# Patient Record
Sex: FEMALE | Race: WHITE | NOT HISPANIC OR LATINO | Employment: FULL TIME | URBAN - METROPOLITAN AREA
[De-identification: names, ages, dates, MRNs, and addresses within clinical notes are randomized per-mention and may not be internally consistent; named-entity substitution may affect disease eponyms.]

---

## 2017-01-20 ENCOUNTER — TELEPHONE (OUTPATIENT)
Dept: INTERNAL MEDICINE | Facility: CLINIC | Age: 42
End: 2017-01-20

## 2017-01-20 RX ORDER — PROMETHAZINE HYDROCHLORIDE 12.5 MG/1
TABLET ORAL
Qty: 10 TABLET | Refills: 0 | Status: SHIPPED | OUTPATIENT
Start: 2017-01-20 | End: 2017-01-26

## 2017-01-20 NOTE — TELEPHONE ENCOUNTER
----- Message from PIERRE Booker sent at 2017 10:39 AM EST -----  Regarding: RE: request for medication FOR VOMITING  Phenergan 12.5mg  Si po q8hrs prn N/V  Disp #10  ----- Message -----     From: Talia Alatorre MA     Sent: 2017  10:16 AM       To: PIERRE Booker  Subject: FW: request for medication FOR VOMITING              ----- Message -----     From: Landy Tripp     Sent: 2017   9:51 AM       To: Tono Brar2 [facility]  Subject: request for medication FOR VOMITING              MARILIN    STOMACH VIRUS SINCE Wednesday AFTERNOON  Still vomiting since Wednesday. Diarrhea also.  NOTHING LEFT BUT BILE AND LIQUID    Is it possible to have something phoned in to get the vomiting to stop.    Rea ([facility])  [phone]    Patient was told Dr. Kuo was not here today but would send message back if another provider would be willing or okay with sending in something.    Sent to pharmacy   Pt aware

## 2017-01-26 ENCOUNTER — OFFICE VISIT (OUTPATIENT)
Dept: INTERNAL MEDICINE | Facility: CLINIC | Age: 42
End: 2017-01-26

## 2017-01-26 VITALS
SYSTOLIC BLOOD PRESSURE: 110 MMHG | BODY MASS INDEX: 32.25 KG/M2 | HEIGHT: 63 IN | HEART RATE: 83 BPM | OXYGEN SATURATION: 98 % | WEIGHT: 182 LBS | DIASTOLIC BLOOD PRESSURE: 80 MMHG

## 2017-01-26 DIAGNOSIS — F17.200 CURRENT SMOKER: ICD-10-CM

## 2017-01-26 DIAGNOSIS — F41.8 DEPRESSION WITH ANXIETY: Primary | ICD-10-CM

## 2017-01-26 DIAGNOSIS — F41.9 ANXIETY: ICD-10-CM

## 2017-01-26 DIAGNOSIS — J45.20 MILD INTERMITTENT ASTHMA WITHOUT COMPLICATION: ICD-10-CM

## 2017-01-26 PROCEDURE — 99214 OFFICE O/P EST MOD 30 MIN: CPT | Performed by: FAMILY MEDICINE

## 2017-01-26 RX ORDER — PROMETHAZINE HYDROCHLORIDE 25 MG/1
TABLET ORAL
Qty: 20 TABLET | Refills: 0 | Status: SHIPPED | OUTPATIENT
Start: 2017-01-26 | End: 2017-05-02

## 2017-01-26 RX ORDER — CLONAZEPAM 1 MG/1
1 TABLET ORAL 2 TIMES DAILY PRN
Qty: 30 TABLET | Refills: 5 | Status: SHIPPED | OUTPATIENT
Start: 2017-01-26 | End: 2017-07-27 | Stop reason: SDUPTHER

## 2017-01-26 NOTE — MR AVS SNAPSHOT
Kadi Gonzalez   1/26/2017 1:15 PM   Office Visit    Dept Phone:  [phone]   Encounter #:  [id number]    Provider:  Bebe Kuo MD   Department:  [facility] INTERNAL MED AND PEDS                Your Full Care Plan              Today's Medication Changes          These changes are accurate as of: 1/26/17  2:02 PM.  If you have any questions, ask your nurse or doctor.               Medication(s)that have changed:     clonazePAM 1 MG tablet   Commonly known as:  KlonoPIN   Take 1 tablet by mouth 2 (Two) Times a Day As Needed for anxiety.   What changed:  reasons to take this   Changed by:  Bebe Kuo MD       promethazine 25 MG tablet   Commonly known as:  PHENERGAN   Take one po every 8 hours prn   What changed:  medication strength   Changed by:  Bebe Kuo MD         Stop taking medication(s)listed here:     benzonatate 200 MG capsule   Commonly known as:  TESSALON   Stopped by:  Bebe Kuo MD           HYDROcod Polst-CPM Polst ER 10-8 MG/5ML ER suspension   Commonly known as:  TUSSIONEX PENNKINETIC ER   Stopped by:  Bebe Kuo MD           MethylPREDNISolone 4 MG tablet   Commonly known as:  MEDROL (LOLI)   Stopped by:  Bebe Kuo MD                Where to Get Your Medications      These medications were sent to [facility] [address] [place] [address] AT [facility] - [phone] [facility] [phone] [place] [id number]     Phone:  [phone]     promethazine 25 MG tablet         You can get these medications from any pharmacy     Bring a paper prescription for each of these medications     clonazePAM 1 MG tablet                  Your Updated Medication List          This list is accurate as of: 1/26/17  2:02 PM.  Always use your most recent med list.                * albuterol 108 (90 BASE) MCG/ACT inhaler   Commonly known as:  PROVENTIL  HFA;VENTOLIN HFA   Inhale 2 puffs every 6 (six) hours as needed for wheezing. Albuterol 90 MCG/ACT AERS; Patient Sig: Albuterol 90 MCG/ACT AERS       * albuterol 1.25 MG/3ML nebulizer solution   Commonly known as:  ACCUNEB   Use 4 times daily.       clonazePAM 1 MG tablet   Commonly known as:  KlonoPIN   Take 1 tablet by mouth 2 (Two) Times a Day As Needed for anxiety.       promethazine 25 MG tablet   Commonly known as:  PHENERGAN   Take one po every 8 hours prn       * Notice:  This list has 2 medication(s) that are the same as other medications prescribed for you. Read the directions carefully, and ask your doctor or other care provider to review them with you.            You Were Diagnosed With        Codes Comments    Depression with anxiety    -  Primary ICD-10-CM: F41.8  ICD-9-CM: 300.4     Mild intermittent asthma without complication     ICD-10-CM: J45.20  ICD-9-CM: 493.90     Current smoker     ICD-10-CM: F17.200  ICD-9-CM: 305.1     Anxiety     ICD-10-CM: F41.9  ICD-9-CM: 300.00       Instructions     None    Patient Instructions History      Upcoming Appointments     Visit Type Date Time Department    FOLLOW UP 2017  1:15 PM [facility]      [facility] Signup     [employer] allows you to send messages to your doctor, view your test results, renew your prescriptions, schedule appointments, and more. To sign up, go to [employer] and click on the Sign Up Now link in the New User? box. Enter your [facility] Activation Code exactly as it appears below along with the last four digits of your Social Security Number and your Date of Birth () to complete the sign-up process. If you do not sign up before the expiration date, you must request a new code.    [facility] Activation Code: [email or web address]  Expires: 2017  2:02 PM    If you have questions, you can email [email or web address] or call [phone] to talk to our [facility] staff. Remember, [facility] is NOT to be  "used for urgent needs. For medical emergencies, dial 911.               Other Info from Your Visit           Allergies     Penicillins        Reason for Visit     Anxiety     Asthma     Follow-up           Vital Signs     Blood Pressure Pulse Height Weight Oxygen Saturation Body Mass Index    110/80 83 63\" (160 cm) 182 lb (82.6 kg) 98% 32.24 kg/m2    Smoking Status                   Current Every Day Smoker           Problems and Diagnoses Noted     Asthma    Smoker    Depression with anxiety    Anxiety problem            "

## 2017-01-26 NOTE — PROGRESS NOTES
Subjective     Kadi Gonzalez is a 41 y.o. female, who presents with a chief complaint of   Chief Complaint   Patient presents with   • Anxiety   • Asthma   • Follow-up       HPI     1. Anxiety.  She states she is doing well.  She is using clonazepam approximately 2-3 times weekly.    2. Asthma.  She states she is using albuterol nebs from the urgent care approximately every other day.  No symptoms overnight.    3. Smoking.  1 PPD.  Didn't tolerate Chantix.  Wants to quit.    The following portions of the patient's history were reviewed and updated as appropriate: allergies, current medications, past family history, past medical history, past social history, past surgical history and problem list.    Allergies: Penicillins    Review of Systems   Constitutional: Negative.    HENT: Negative.    Eyes: Negative.    Respiratory: Negative.    Cardiovascular: Negative.    Gastrointestinal: Negative.    Endocrine: Negative.    Genitourinary: Negative.    Musculoskeletal: Negative.    Skin: Negative.    Allergic/Immunologic: Negative.    Neurological: Negative.    Hematological: Negative.    Psychiatric/Behavioral: The patient is nervous/anxious.        Objective     Wt Readings from Last 3 Encounters:   01/26/17 182 lb (82.6 kg)   11/07/16 187 lb (84.8 kg)   10/22/16 180 lb (81.6 kg)     Temp Readings from Last 3 Encounters:   11/05/16 97.7 °F (36.5 °C)   10/22/16 98.4 °F (36.9 °C)   04/07/16 97.7 °F (36.5 °C)     BP Readings from Last 3 Encounters:   01/26/17 110/80   11/07/16 127/89   11/05/16 123/85     Pulse Readings from Last 3 Encounters:   01/26/17 83   11/07/16 73   11/05/16 94     Body mass index is 32.24 kg/(m^2).  SpO2 Readings from Last 3 Encounters:   01/26/17 98%   11/05/16 99%   10/22/16 96%       Physical Exam   Constitutional: She is oriented to person, place, and time. She appears well-developed and well-nourished.   HENT:   Head: Normocephalic.   Mouth/Throat: Oropharynx is clear and moist.   Eyes:  Conjunctivae and EOM are normal. Pupils are equal, round, and reactive to light.   Neck: Normal range of motion. Neck supple. No thyromegaly present.   Cardiovascular: Normal rate, regular rhythm and normal heart sounds.    Pulmonary/Chest: Effort normal and breath sounds normal.   Abdominal: Soft. Bowel sounds are normal. There is no hepatosplenomegaly.   Musculoskeletal: Normal range of motion. She exhibits no edema.   Lymphadenopathy:     She has no cervical adenopathy.   Neurological: She is alert and oriented to person, place, and time.   Skin: Skin is warm and dry. No rash noted.   Psychiatric: She has a normal mood and affect. Her behavior is normal.   Vitals reviewed.        Assessment/Plan   Kadi was seen today for anxiety, asthma and follow-up.    Diagnoses and all orders for this visit:    Depression with anxiety    Mild intermittent asthma without complication    Current smoker    Anxiety  -     clonazePAM (KlonoPIN) 1 MG tablet; Take 1 tablet by mouth 2 (Two) Times a Day As Needed for anxiety.    Other orders  -     promethazine (PHENERGAN) 25 MG tablet; Take one po every 8 hours prn    1. Anxiety.  Controlled with prn clonazepam (15-20 per month).  Med management agreement and alex UTD.    2. Asthma.  No flare.  Continue prn albuterol.    3. Smoking.  Pt counseled.      Outpatient Medications Prior to Visit   Medication Sig Dispense Refill   • albuterol (ACCUNEB) 1.25 MG/3ML nebulizer solution Use 4 times daily. 25 vial 2   • albuterol (PROVENTIL HFA;VENTOLIN HFA) 108 (90 BASE) MCG/ACT inhaler Inhale 2 puffs every 6 (six) hours as needed for wheezing. Albuterol 90 MCG/ACT AERS; Patient Sig: Albuterol 90 MCG/ACT AERS 1 inhaler 5   • clonazePAM (KlonoPIN) 1 MG tablet Take 1 tablet by mouth 2 (two) times a day as needed for seizures. 30 tablet 5   • promethazine (PHENERGAN) 12.5 MG tablet Take one po every 8 hours prn 10 tablet 0   • benzonatate (TESSALON) 200 MG capsule Take 1 capsule by mouth 3  (Three) Times a Day As Needed for cough. 30 capsule 0   • HYDROcod Polst-CPM Polst ER (TUSSIONEX PENNKINETIC ER) 10-8 MG/5ML ER suspension May take 1 teaspoon twice daily as needed for cough. 115 mL 0   • MethylPREDNISolone (MEDROL, LOLI,) 4 MG tablet Take as directed on package instructions. 21 tablet 0     No facility-administered medications prior to visit.      New Medications Ordered This Visit   Medications   • clonazePAM (KlonoPIN) 1 MG tablet     Sig: Take 1 tablet by mouth 2 (Two) Times a Day As Needed for anxiety.     Dispense:  30 tablet     Refill:  5   • promethazine (PHENERGAN) 25 MG tablet     Sig: Take one po every 8 hours prn     Dispense:  20 tablet     Refill:  0     [unfilled]  Medications Discontinued During This Encounter   Medication Reason   • benzonatate (TESSALON) 200 MG capsule Therapy completed   • HYDROcod Polst-CPM Polst ER (TUSSIONEX PENNKINETIC ER) 10-8 MG/5ML ER suspension Therapy completed   • MethylPREDNISolone (MEDROL, LOLI,) 4 MG tablet Therapy completed   • promethazine (PHENERGAN) 12.5 MG tablet Therapy completed   • clonazePAM (KlonoPIN) 1 MG tablet Reorder         Return in about 6 months (around 7/26/2017).

## 2017-05-02 ENCOUNTER — TELEPHONE (OUTPATIENT)
Dept: INTERNAL MEDICINE | Facility: CLINIC | Age: 42
End: 2017-05-02

## 2017-05-02 ENCOUNTER — HOSPITAL ENCOUNTER (OUTPATIENT)
Dept: GENERAL RADIOLOGY | Facility: HOSPITAL | Age: 42
Discharge: HOME | End: 2017-05-02
Admitting: INTERNAL MEDICINE

## 2017-05-02 ENCOUNTER — OFFICE VISIT (OUTPATIENT)
Dept: INTERNAL MEDICINE | Facility: CLINIC | Age: 42
End: 2017-05-02

## 2017-05-02 VITALS
HEIGHT: 63 IN | DIASTOLIC BLOOD PRESSURE: 78 MMHG | RESPIRATION RATE: 18 BRPM | SYSTOLIC BLOOD PRESSURE: 136 MMHG | TEMPERATURE: 101.5 F | OXYGEN SATURATION: 98 % | WEIGHT: 178.8 LBS | HEART RATE: 125 BPM | BODY MASS INDEX: 31.68 KG/M2

## 2017-05-02 DIAGNOSIS — F17.200 CURRENT SMOKER: ICD-10-CM

## 2017-05-02 DIAGNOSIS — R50.9 FEVER, UNSPECIFIED FEVER CAUSE: Primary | ICD-10-CM

## 2017-05-02 DIAGNOSIS — J18.9 PNEUMONIA DUE TO INFECTIOUS ORGANISM, UNSPECIFIED LATERALITY, UNSPECIFIED PART OF LUNG: ICD-10-CM

## 2017-05-02 LAB
ALBUMIN SERPL-MCNC: 4.4 G/DL (ref 3.5–5.2)
ALBUMIN/GLOB SERPL: 1.5 G/DL
ALP SERPL-CCNC: 90 U/L (ref 40–129)
ALT SERPL-CCNC: 43 U/L (ref 5–33)
AST SERPL-CCNC: 33 U/L (ref 5–32)
BASOPHILS # BLD AUTO: 0.02 10*3/MM3 (ref 0–0.2)
BASOPHILS NFR BLD AUTO: 0.2 % (ref 0–2)
BILIRUB SERPL-MCNC: 0.6 MG/DL (ref 0.2–1.2)
BUN SERPL-MCNC: 6 MG/DL (ref 6–20)
BUN/CREAT SERPL: 8.3 (ref 7–25)
CALCIUM SERPL-MCNC: 9.1 MG/DL (ref 8.6–10.5)
CHLORIDE SERPL-SCNC: 90 MMOL/L (ref 98–107)
CO2 SERPL-SCNC: 24.8 MMOL/L (ref 22–29)
CREAT SERPL-MCNC: 0.72 MG/DL (ref 0.57–1)
EOSINOPHIL # BLD AUTO: 0 10*3/MM3 (ref 0.1–0.3)
EOSINOPHIL NFR BLD AUTO: 0 % (ref 0–4)
ERYTHROCYTE [DISTWIDTH] IN BLOOD BY AUTOMATED COUNT: 13.2 % (ref 11.5–14.5)
EXPIRATION DATE: NORMAL
FLUAV AG NPH QL: NORMAL
FLUBV AG NPH QL: NORMAL
GLOBULIN SER CALC-MCNC: 2.9 GM/DL
GLUCOSE SERPL-MCNC: 87 MG/DL (ref 65–99)
HCT VFR BLD AUTO: 41.9 % (ref 37–47)
HGB BLD-MCNC: 14.4 G/DL (ref 12–16)
IMM GRANULOCYTES # BLD: 0.03 10*3/MM3 (ref 0–0.03)
IMM GRANULOCYTES NFR BLD: 0.3 % (ref 0–0.5)
INTERNAL CONTROL: NORMAL
LYMPHOCYTES # BLD AUTO: 1 10*3/MM3 (ref 0.6–4.8)
LYMPHOCYTES NFR BLD AUTO: 10.4 % (ref 20–45)
Lab: NORMAL
MCH RBC QN AUTO: 31.6 PG (ref 27–31)
MCHC RBC AUTO-ENTMCNC: 34.4 G/DL (ref 31–37)
MCV RBC AUTO: 91.9 FL (ref 81–99)
MONOCYTES # BLD AUTO: 0.56 10*3/MM3 (ref 0–1)
MONOCYTES NFR BLD AUTO: 5.8 % (ref 3–8)
NEUTROPHILS # BLD AUTO: 8.04 10*3/MM3 (ref 1.5–8.3)
NEUTROPHILS NFR BLD AUTO: 83.3 % (ref 45–70)
NRBC BLD AUTO-RTO: 0 /100 WBC (ref 0–0)
PLATELET # BLD AUTO: 139 10*3/MM3 (ref 140–500)
POTASSIUM SERPL-SCNC: 3.2 MMOL/L (ref 3.5–5.2)
PROT SERPL-MCNC: 7.3 G/DL (ref 6–8.5)
RBC # BLD AUTO: 4.56 10*6/MM3 (ref 4.2–5.4)
SODIUM SERPL-SCNC: 135 MMOL/L (ref 136–145)
WBC # BLD AUTO: 9.65 10*3/MM3 (ref 4.8–10.8)

## 2017-05-02 PROCEDURE — 71020 HC CHEST PA AND LATERAL: CPT

## 2017-05-02 PROCEDURE — 99214 OFFICE O/P EST MOD 30 MIN: CPT | Performed by: INTERNAL MEDICINE

## 2017-05-02 PROCEDURE — 99406 BEHAV CHNG SMOKING 3-10 MIN: CPT | Performed by: INTERNAL MEDICINE

## 2017-05-02 PROCEDURE — 87804 INFLUENZA ASSAY W/OPTIC: CPT | Performed by: INTERNAL MEDICINE

## 2017-05-02 RX ORDER — METHYLPREDNISOLONE SODIUM SUCCINATE 125 MG/2ML
125 INJECTION, POWDER, LYOPHILIZED, FOR SOLUTION INTRAMUSCULAR; INTRAVENOUS EVERY 6 HOURS
Status: DISCONTINUED | OUTPATIENT
Start: 2017-05-02 | End: 2017-07-27

## 2017-05-02 RX ORDER — LEVOFLOXACIN 500 MG/1
500 TABLET, FILM COATED ORAL DAILY
Qty: 7 TABLET | Refills: 0 | Status: SHIPPED | OUTPATIENT
Start: 2017-05-02 | End: 2017-07-27

## 2017-05-02 RX ADMIN — METHYLPREDNISOLONE SODIUM SUCCINATE 125 MG: 125 INJECTION, POWDER, LYOPHILIZED, FOR SOLUTION INTRAMUSCULAR; INTRAVENOUS at 14:16

## 2017-05-03 DIAGNOSIS — J45.909 UNCOMPLICATED ASTHMA, UNSPECIFIED ASTHMA SEVERITY: ICD-10-CM

## 2017-05-04 ENCOUNTER — TELEPHONE (OUTPATIENT)
Dept: INTERNAL MEDICINE | Facility: CLINIC | Age: 42
End: 2017-05-04

## 2017-05-04 DIAGNOSIS — J18.9 PNEUMONIA DUE TO INFECTIOUS ORGANISM, UNSPECIFIED LATERALITY, UNSPECIFIED PART OF LUNG: Primary | ICD-10-CM

## 2017-05-04 RX ORDER — ALBUTEROL SULFATE 90 UG/1
AEROSOL, METERED RESPIRATORY (INHALATION)
Qty: 18 G | Refills: 0 | Status: SHIPPED | OUTPATIENT
Start: 2017-05-04 | End: 2017-07-31 | Stop reason: SDUPTHER

## 2017-05-09 ENCOUNTER — RESULTS ENCOUNTER (OUTPATIENT)
Dept: INTERNAL MEDICINE | Facility: CLINIC | Age: 42
End: 2017-05-09

## 2017-05-09 DIAGNOSIS — J18.9 PNEUMONIA DUE TO INFECTIOUS ORGANISM, UNSPECIFIED LATERALITY, UNSPECIFIED PART OF LUNG: ICD-10-CM

## 2017-05-17 ENCOUNTER — OFFICE VISIT (OUTPATIENT)
Dept: INTERNAL MEDICINE | Facility: CLINIC | Age: 42
End: 2017-05-17

## 2017-05-17 VITALS
HEART RATE: 88 BPM | HEIGHT: 63 IN | WEIGHT: 180.4 LBS | OXYGEN SATURATION: 97 % | DIASTOLIC BLOOD PRESSURE: 80 MMHG | SYSTOLIC BLOOD PRESSURE: 122 MMHG | BODY MASS INDEX: 31.96 KG/M2

## 2017-05-17 DIAGNOSIS — J45.20 MILD INTERMITTENT ASTHMA WITHOUT COMPLICATION: Primary | ICD-10-CM

## 2017-05-17 DIAGNOSIS — J18.9 PNEUMONIA DUE TO INFECTIOUS ORGANISM, UNSPECIFIED LATERALITY, UNSPECIFIED PART OF LUNG: ICD-10-CM

## 2017-05-17 DIAGNOSIS — F17.200 CURRENT SMOKER: ICD-10-CM

## 2017-05-17 LAB
ALBUMIN SERPL-MCNC: 4 G/DL (ref 3.5–5.2)
ALBUMIN/GLOB SERPL: 1.6 G/DL
ALP SERPL-CCNC: 76 U/L (ref 40–129)
ALT SERPL-CCNC: 30 U/L (ref 5–33)
AST SERPL-CCNC: 39 U/L (ref 5–32)
BASOPHILS # BLD AUTO: 0.03 10*3/MM3 (ref 0–0.2)
BASOPHILS NFR BLD AUTO: 0.5 % (ref 0–2)
BILIRUB SERPL-MCNC: 0.3 MG/DL (ref 0.2–1.2)
BUN SERPL-MCNC: 7 MG/DL (ref 6–20)
BUN/CREAT SERPL: 11.9 (ref 7–25)
CALCIUM SERPL-MCNC: 9.3 MG/DL (ref 8.6–10.5)
CHLORIDE SERPL-SCNC: 95 MMOL/L (ref 98–107)
CO2 SERPL-SCNC: 26.9 MMOL/L (ref 22–29)
CREAT SERPL-MCNC: 0.59 MG/DL (ref 0.57–1)
EOSINOPHIL # BLD AUTO: 0.07 10*3/MM3 (ref 0.1–0.3)
EOSINOPHIL NFR BLD AUTO: 1.3 % (ref 0–4)
ERYTHROCYTE [DISTWIDTH] IN BLOOD BY AUTOMATED COUNT: 14.2 % (ref 11.5–14.5)
GLOBULIN SER CALC-MCNC: 2.5 GM/DL
GLUCOSE SERPL-MCNC: 79 MG/DL (ref 65–99)
HCT VFR BLD AUTO: 42.8 % (ref 37–47)
HGB BLD-MCNC: 14.6 G/DL (ref 12–16)
IMM GRANULOCYTES # BLD: 0.01 10*3/MM3 (ref 0–0.03)
IMM GRANULOCYTES NFR BLD: 0.2 % (ref 0–0.5)
LYMPHOCYTES # BLD AUTO: 1.56 10*3/MM3 (ref 0.6–4.8)
LYMPHOCYTES NFR BLD AUTO: 27.9 % (ref 20–45)
MCH RBC QN AUTO: 31.4 PG (ref 27–31)
MCHC RBC AUTO-ENTMCNC: 34.1 G/DL (ref 31–37)
MCV RBC AUTO: 92 FL (ref 81–99)
MONOCYTES # BLD AUTO: 0.41 10*3/MM3 (ref 0–1)
MONOCYTES NFR BLD AUTO: 7.3 % (ref 3–8)
NEUTROPHILS # BLD AUTO: 3.51 10*3/MM3 (ref 1.5–8.3)
NEUTROPHILS NFR BLD AUTO: 62.8 % (ref 45–70)
NRBC BLD AUTO-RTO: 0 /100 WBC (ref 0–0)
PLATELET # BLD AUTO: 233 10*3/MM3 (ref 140–500)
POTASSIUM SERPL-SCNC: 4.2 MMOL/L (ref 3.5–5.2)
PROT SERPL-MCNC: 6.5 G/DL (ref 6–8.5)
RBC # BLD AUTO: 4.65 10*6/MM3 (ref 4.2–5.4)
SODIUM SERPL-SCNC: 137 MMOL/L (ref 136–145)
WBC # BLD AUTO: 5.59 10*3/MM3 (ref 4.8–10.8)

## 2017-05-17 PROCEDURE — 99214 OFFICE O/P EST MOD 30 MIN: CPT | Performed by: INTERNAL MEDICINE

## 2017-05-17 PROCEDURE — 99406 BEHAV CHNG SMOKING 3-10 MIN: CPT | Performed by: INTERNAL MEDICINE

## 2017-05-25 ENCOUNTER — TELEPHONE (OUTPATIENT)
Dept: INTERNAL MEDICINE | Facility: CLINIC | Age: 42
End: 2017-05-25

## 2017-07-03 ENCOUNTER — TELEPHONE (OUTPATIENT)
Dept: INTERNAL MEDICINE | Facility: CLINIC | Age: 42
End: 2017-07-03

## 2017-07-03 NOTE — TELEPHONE ENCOUNTER
Patient notified.   ----- Message from Matt Varner MD sent at 6/30/2017  5:14 PM EDT -----  Labs are great. No changes.

## 2017-07-27 ENCOUNTER — OFFICE VISIT (OUTPATIENT)
Dept: INTERNAL MEDICINE | Facility: CLINIC | Age: 42
End: 2017-07-27

## 2017-07-27 VITALS
HEART RATE: 89 BPM | HEIGHT: 63 IN | TEMPERATURE: 98.1 F | WEIGHT: 184.6 LBS | OXYGEN SATURATION: 98 % | SYSTOLIC BLOOD PRESSURE: 110 MMHG | DIASTOLIC BLOOD PRESSURE: 72 MMHG | BODY MASS INDEX: 32.71 KG/M2

## 2017-07-27 DIAGNOSIS — F17.200 CURRENT SMOKER: ICD-10-CM

## 2017-07-27 DIAGNOSIS — F41.9 ANXIETY: ICD-10-CM

## 2017-07-27 DIAGNOSIS — Z00.00 ANNUAL PHYSICAL EXAM: Primary | ICD-10-CM

## 2017-07-27 PROCEDURE — 99396 PREV VISIT EST AGE 40-64: CPT | Performed by: FAMILY MEDICINE

## 2017-07-27 RX ORDER — CLONAZEPAM 1 MG/1
1 TABLET ORAL 2 TIMES DAILY PRN
Qty: 30 TABLET | Refills: 2 | Status: SHIPPED | OUTPATIENT
Start: 2017-07-27 | End: 2018-06-11

## 2017-07-27 NOTE — PROGRESS NOTES
Patient Name: Kadi Wallis is a 41 y.o. female presenting for Annual Exam and Anxiety      Well Adult Physical   Patient here for a comprehensive physical exam.The patient reports no problems    Do you take any herbs or supplements that were not prescribed by a doctor? no   Are you taking calcium supplements? no   Are you taking aspirin daily? no     History:  Normal pap smear and high risk HPV last year.    Kadi Gonzalez 41 y.o. female who presents for an Annual Wellness Visit.  she has a history of   Patient Active Problem List   Diagnosis   • Atopic rhinitis   • Anxiety   • Asthma   • Impaired glucose tolerance   • Visual disturbance   • Health care maintenance   • Abnormal Pap smear of cervix   • Current smoker   • Pneumonia due to infectious organism   .  she has been doing well with new interval problems.      Health Habits:  Dental Exam. up to date  Eye Exam. up to date  Exercise: 0 times/week.  Current exercise activities include: none      The following portions of the patient's history were reviewed and updated as appropriate: allergies, current medications, past family history, past medical history, past social history, past surgical history and problem list.    Review of Systems   Constitutional: Negative.    HENT: Negative.    Eyes: Negative.    Respiratory: Negative.    Cardiovascular: Negative.    Gastrointestinal: Negative.    Endocrine: Negative.    Genitourinary: Negative.    Musculoskeletal: Negative.    Skin: Negative.    Allergic/Immunologic: Negative.    Neurological: Negative.    Hematological: Negative.    Psychiatric/Behavioral: The patient is nervous/anxious.        Penicillins      Current Outpatient Prescriptions:   •  albuterol (ACCUNEB) 1.25 MG/3ML nebulizer solution, Use 4 times daily., Disp: 25 vial, Rfl: 2  •  clonazePAM (KlonoPIN) 1 MG tablet, Take 1 tablet by mouth 2 (Two) Times a Day As Needed for Anxiety., Disp: 30 tablet, Rfl: 2  •  Fluticasone  "Furoate-Vilanterol (BREO ELLIPTA) 100-25 MCG/INH aerosol powder , Inhale 1 puff Daily., Disp: 1 each, Rfl: 0  •  VENTOLIN  (90 BASE) MCG/ACT inhaler, INHALE 2 PUFFS EVERY 6 HOURS AS NEEDED FOR WHEEZING, Disp: 18 g, Rfl: 0  No current facility-administered medications for this visit.     OBJECTIVE    /72  Pulse 89  Temp 98.1 °F (36.7 °C)  Ht 63\" (160 cm)  Wt 184 lb 9.6 oz (83.7 kg)  SpO2 98%  BMI 32.7 kg/m2    Physical Exam   Constitutional: She is oriented to person, place, and time. She appears well-developed and well-nourished.   HENT:   Head: Normocephalic and atraumatic.   Right Ear: External ear normal.   Left Ear: External ear normal.   Nose: Nose normal.   Mouth/Throat: Oropharynx is clear and moist.   Eyes: Conjunctivae and EOM are normal. Pupils are equal, round, and reactive to light. No scleral icterus.   Neck: Normal range of motion. Neck supple. No thyromegaly present.   Cardiovascular: Normal rate, regular rhythm, normal heart sounds and intact distal pulses.  Exam reveals no gallop and no friction rub.    No murmur heard.  Pulmonary/Chest: Effort normal and breath sounds normal. No respiratory distress. She has no wheezes. She has no rales.   Abdominal: Soft. Bowel sounds are normal. There is no hepatosplenomegaly.   Musculoskeletal: She exhibits no edema or deformity.   Lymphadenopathy:     She has no cervical adenopathy.   Neurological: She is alert and oriented to person, place, and time. She has normal reflexes. She displays normal reflexes. No cranial nerve deficit. She exhibits normal muscle tone. Coordination normal.   Skin: Skin is warm and dry. No rash noted.   Psychiatric: She has a normal mood and affect. Her behavior is normal. Judgment and thought content normal.         [unfilled]    ASSESSMENT AND PLAN  Update vaccines if indicated.    quit smoking and begin progressive daily aerobic exercise program    Kadi was seen today for annual exam and " anxiety.    Diagnoses and all orders for this visit:    Annual physical exam  -     Mammo Screening Bilateral With CAD; Future    Anxiety  -     clonazePAM (KlonoPIN) 1 MG tablet; Take 1 tablet by mouth 2 (Two) Times a Day As Needed for Anxiety.    Current smoker      1. Annual physical exam.  Labs reviewed.  Pap smear UTD.  Mammogram ordered.  She will obtain Tdap at health department.    2. Anxiety.  Situational.  Controlled with occasional clonazepam.  Med management agreement and Fabio UTD.    3. Smoking.  Patient counseled.      [unfilled]    Return in about 3 months (around 10/27/2017), or if symptoms worsen or fail to improve.

## 2017-07-31 DIAGNOSIS — J45.909 UNCOMPLICATED ASTHMA, UNSPECIFIED ASTHMA SEVERITY: ICD-10-CM

## 2017-07-31 RX ORDER — ALBUTEROL SULFATE 90 UG/1
AEROSOL, METERED RESPIRATORY (INHALATION)
Qty: 18 G | Refills: 5 | Status: SHIPPED | OUTPATIENT
Start: 2017-07-31 | End: 2018-12-24 | Stop reason: SDUPTHER

## 2017-08-01 ENCOUNTER — HOSPITAL ENCOUNTER (OUTPATIENT)
Dept: MAMMOGRAPHY | Facility: HOSPITAL | Age: 42
Discharge: HOME | End: 2017-08-01
Attending: FAMILY MEDICINE | Admitting: FAMILY MEDICINE

## 2017-08-01 DIAGNOSIS — Z00.00 ANNUAL PHYSICAL EXAM: ICD-10-CM

## 2017-08-01 PROCEDURE — G0202 SCR MAMMO BI INCL CAD: HCPCS

## 2017-08-01 PROCEDURE — 77063 BREAST TOMOSYNTHESIS BI: CPT

## 2017-10-04 ENCOUNTER — OFFICE VISIT (OUTPATIENT)
Dept: INTERNAL MEDICINE | Facility: CLINIC | Age: 42
End: 2017-10-04

## 2017-10-04 VITALS
SYSTOLIC BLOOD PRESSURE: 104 MMHG | HEIGHT: 63 IN | BODY MASS INDEX: 33.38 KG/M2 | DIASTOLIC BLOOD PRESSURE: 64 MMHG | TEMPERATURE: 98.3 F | WEIGHT: 188.4 LBS

## 2017-10-04 DIAGNOSIS — F10.21 HISTORY OF ALCOHOLISM (HCC): ICD-10-CM

## 2017-10-04 DIAGNOSIS — F41.9 ANXIETY: Primary | ICD-10-CM

## 2017-10-04 DIAGNOSIS — G47.00 INSOMNIA, UNSPECIFIED TYPE: ICD-10-CM

## 2017-10-04 PROCEDURE — 99214 OFFICE O/P EST MOD 30 MIN: CPT | Performed by: FAMILY MEDICINE

## 2017-10-04 RX ORDER — CITALOPRAM 20 MG/1
20 TABLET ORAL DAILY
COMMUNITY
End: 2017-10-04 | Stop reason: SDUPTHER

## 2017-10-04 RX ORDER — CITALOPRAM 20 MG/1
20 TABLET ORAL DAILY
Qty: 30 TABLET | Refills: 5 | Status: SHIPPED | OUTPATIENT
Start: 2017-10-04 | End: 2018-01-08 | Stop reason: SDUPTHER

## 2017-10-04 RX ORDER — TRAZODONE HYDROCHLORIDE 100 MG/1
100 TABLET ORAL NIGHTLY
COMMUNITY
End: 2017-10-04 | Stop reason: SDUPTHER

## 2017-10-04 RX ORDER — TRAZODONE HYDROCHLORIDE 100 MG/1
100 TABLET ORAL NIGHTLY
Qty: 30 TABLET | Refills: 5 | Status: SHIPPED | OUTPATIENT
Start: 2017-10-04 | End: 2018-01-08

## 2017-10-04 RX ORDER — HYDROXYZINE PAMOATE 25 MG/1
25 CAPSULE ORAL 3 TIMES DAILY PRN
COMMUNITY
End: 2018-01-08 | Stop reason: SDUPTHER

## 2017-10-04 NOTE — PROGRESS NOTES
Subjective     Kadi Gonzalez is a 42 y.o. female, who presents with a chief complaint of   Chief Complaint   Patient presents with   • Depression       HPI     Pt here to f/u on recent medication changes.      1. Alcoholism.  She was recently treated at [facility] with intensive outpatient therapy X 21 days for alcohol abuse and is doing well.  She finished this one week ago.  She is doing well and is continuing once weekly outpatient treatment and goes to AA meetings.      2. Anxiety.  Dr. Plascencia at [facility] started her on citalopram 20 mg daily and she is doing well with this.  She was also started on hydroxyzine for anxiety but hasn't been taking this much.  She hasn't been taking clonazepam.    3. Insomnia.  Dr. Plascencia started her on trazodone and it is helping.    The following portions of the patient's history were reviewed and updated as appropriate: allergies, current medications, past family history, past medical history, past social history, past surgical history and problem list.    Allergies: Penicillins    Review of Systems   Constitutional: Negative.    HENT: Negative.    Eyes: Negative.    Respiratory: Negative.    Cardiovascular: Negative.    Gastrointestinal: Negative.    Endocrine: Negative.    Genitourinary: Negative.    Musculoskeletal: Negative.    Skin: Negative.    Allergic/Immunologic: Negative.    Neurological: Negative.    Hematological: Negative.    Psychiatric/Behavioral: The patient is nervous/anxious.        Objective     Wt Readings from Last 3 Encounters:   10/04/17 188 lb 6.4 oz (85.5 kg)   07/27/17 184 lb 9.6 oz (83.7 kg)   05/17/17 180 lb 6.4 oz (81.8 kg)     Temp Readings from Last 3 Encounters:   10/04/17 98.3 °F (36.8 °C)   07/27/17 98.1 °F (36.7 °C)   05/02/17 (!) 101.5 °F (38.6 °C)     BP Readings from Last 3 Encounters:   10/04/17 104/64   07/27/17 110/72   05/17/17 122/80     Pulse Readings from Last 3 Encounters:   07/27/17 89   05/17/17 88   05/02/17 (!) 125     Body mass  index is 33.37 kg/(m^2).  SpO2 Readings from Last 3 Encounters:   07/27/17 98%   05/17/17 97%   05/02/17 98%       Physical Exam   Constitutional: She is oriented to person, place, and time. She appears well-developed and well-nourished.   HENT:   Head: Normocephalic and atraumatic.   Eyes: Conjunctivae and EOM are normal. No scleral icterus.   Neck: Neck supple.   Cardiovascular: Normal rate, regular rhythm and normal heart sounds.    No murmur heard.  Pulmonary/Chest: Effort normal. No respiratory distress.   Abdominal: Soft. There is no hepatosplenomegaly. There is no tenderness.   Musculoskeletal: Normal range of motion. She exhibits no edema.   Lymphadenopathy:     She has no cervical adenopathy.   Neurological: She is alert and oriented to person, place, and time.   Skin: Skin is warm and dry. No rash noted.   Psychiatric: She has a normal mood and affect. Her behavior is normal.       Results for orders placed or performed in visit on 05/17/17   Comprehensive metabolic panel   Result Value Ref Range    Glucose 79 65 - 99 mg/dL    BUN 7 6 - 20 mg/dL    Creatinine 0.59 0.57 - 1.00 mg/dL    eGFR Non African Am 112 >60 mL/min/1.73    eGFR African Am 136 >60 mL/min/1.73    BUN/Creatinine Ratio 11.9 7.0 - 25.0    Sodium 137 136 - 145 mmol/L    Potassium 4.2 3.5 - 5.2 mmol/L    Chloride 95 (L) 98 - 107 mmol/L    Total CO2 26.9 22.0 - 29.0 mmol/L    Calcium 9.3 8.6 - 10.5 mg/dL    Total Protein 6.5 6.0 - 8.5 g/dL    Albumin 4.00 3.50 - 5.20 g/dL    Globulin 2.5 gm/dL    A/G Ratio 1.6 g/dL    Total Bilirubin 0.3 0.2 - 1.2 mg/dL    Alkaline Phosphatase 76 40 - 129 U/L    AST (SGOT) 39 (H) 5 - 32 U/L    ALT (SGPT) 30 5 - 33 U/L   CBC w AUTO Differential   Result Value Ref Range    WBC 5.59 4.80 - 10.80 10*3/mm3    RBC 4.65 4.20 - 5.40 10*6/mm3    Hemoglobin 14.6 12.0 - 16.0 g/dL    Hematocrit 42.8 37.0 - 47.0 %    MCV 92.0 81.0 - 99.0 fL    MCH 31.4 (H) 27.0 - 31.0 pg    MCHC 34.1 31.0 - 37.0 g/dL    RDW 14.2 11.5 -  14.5 %    Platelets 233 140 - 500 10*3/mm3    Neutrophil Rel % 62.8 45.0 - 70.0 %    Lymphocyte Rel % 27.9 20.0 - 45.0 %    Monocyte Rel % 7.3 3.0 - 8.0 %    Eosinophil Rel % 1.3 0.0 - 4.0 %    Basophil Rel % 0.5 0.0 - 2.0 %    Neutrophils Absolute 3.51 1.50 - 8.30 10*3/mm3    Lymphocytes Absolute 1.56 0.60 - 4.80 10*3/mm3    Monocytes Absolute 0.41 0.00 - 1.00 10*3/mm3    Eosinophils Absolute 0.07 (L) 0.10 - 0.30 10*3/mm3    Basophils Absolute 0.03 0.00 - 0.20 10*3/mm3    Immature Granulocyte Rel % 0.2 0.0 - 0.5 %    Immature Grans Absolute 0.01 0.00 - 0.03 10*3/mm3    nRBC 0.0 0.0 - 0.0 /100 WBC       Assessment/Plan   Kadi was seen today for depression.    Diagnoses and all orders for this visit:    Anxiety  -     citalopram (CeleXA) 20 MG tablet; Take 1 tablet by mouth Daily.    History of alcoholism    Insomnia, unspecified type  -     traZODone (DESYREL) 100 MG tablet; Take 1 tablet by mouth Every Night.      1. Anxiety. Doing well.  Continue citalopram.    2. Alcoholism.  Doing well.  Continue outpatient treatment and AA.    3. Insomnia.  Controlled with trazodone.      Outpatient Medications Prior to Visit   Medication Sig Dispense Refill   • albuterol (ACCUNEB) 1.25 MG/3ML nebulizer solution Use 4 times daily. 25 vial 2   • clonazePAM (KlonoPIN) 1 MG tablet Take 1 tablet by mouth 2 (Two) Times a Day As Needed for Anxiety. 30 tablet 2   • VENTOLIN  (90 BASE) MCG/ACT inhaler INHALE 2 PUFFS BY MOUTH EVERY 6 HOURS AS NEEDED FOR WHEEZING 18 g 5   • Fluticasone Furoate-Vilanterol (BREO ELLIPTA) 100-25 MCG/INH aerosol powder  Inhale 1 puff Daily. 1 each 0     No facility-administered medications prior to visit.      New Medications Ordered This Visit   Medications   • citalopram (CeleXA) 20 MG tablet     Sig: Take 1 tablet by mouth Daily.     Dispense:  30 tablet     Refill:  5     She would like to pick this up on 10/25/17.   • traZODone (DESYREL) 100 MG tablet     Sig: Take 1 tablet by mouth Every Night.      Dispense:  30 tablet     Refill:  5     She would like to pick this up on 10/25/17.     [unfilled]  Medications Discontinued During This Encounter   Medication Reason   • Fluticasone Furoate-Vilanterol (BREO ELLIPTA) 100-25 MCG/INH aerosol powder  Therapy completed   • citalopram (CeleXA) 20 MG tablet Reorder   • traZODone (DESYREL) 100 MG tablet Reorder         Return in about 3 months (around 1/4/2018), or if symptoms worsen or fail to improve.

## 2017-12-05 ENCOUNTER — TELEPHONE (OUTPATIENT)
Dept: INTERNAL MEDICINE | Facility: CLINIC | Age: 42
End: 2017-12-05

## 2017-12-05 NOTE — TELEPHONE ENCOUNTER
"----- Message from Bebe Gaston MD sent at 12/4/2017  6:18 PM EST -----  Regarding: RE: STOMACH ISSUES  Looks like she's been scheduled for an appointment.  ----- Message -----     From: Marie Wallace MA     Sent: 12/4/2017   4:18 PM       To: Bebe Gaston MD  Subject: FW: STOMACH ISSUES                                   ----- Message -----     From: Landy Tripp     Sent: 12/4/2017   4:04 PM       To: Marie Wallace MA  Subject: STOMACH ISSUES                                   [place]    Patient phoned today requesting appointment.  States she is having stomach pain x couple of weeks with diarrhea.  Hasn't had a solid bowel movement in weeks.    Tried altering diet to \"better\" food with no change. Ate some [employer] assuming she would pay for it but there was no difference.        This patient has an appointment for 12/11/2017  "

## 2017-12-08 ENCOUNTER — OFFICE VISIT (OUTPATIENT)
Dept: INTERNAL MEDICINE | Facility: CLINIC | Age: 42
End: 2017-12-08

## 2017-12-08 VITALS
SYSTOLIC BLOOD PRESSURE: 112 MMHG | HEIGHT: 63 IN | WEIGHT: 190 LBS | HEART RATE: 89 BPM | BODY MASS INDEX: 33.66 KG/M2 | TEMPERATURE: 97.8 F | RESPIRATION RATE: 16 BRPM | DIASTOLIC BLOOD PRESSURE: 60 MMHG | OXYGEN SATURATION: 98 %

## 2017-12-08 DIAGNOSIS — R10.9 LEFT LATERAL ABDOMINAL PAIN: ICD-10-CM

## 2017-12-08 DIAGNOSIS — K90.9 DIARRHEA DUE TO MALABSORPTION: ICD-10-CM

## 2017-12-08 DIAGNOSIS — R19.7 DIARRHEA DUE TO MALABSORPTION: ICD-10-CM

## 2017-12-08 DIAGNOSIS — R53.82 CHRONIC FATIGUE: ICD-10-CM

## 2017-12-08 DIAGNOSIS — F10.21 HISTORY OF ALCOHOLISM (HCC): Primary | ICD-10-CM

## 2017-12-08 DIAGNOSIS — R73.9 HYPERGLYCEMIA: ICD-10-CM

## 2017-12-08 LAB
ALBUMIN SERPL-MCNC: 3.9 G/DL (ref 3.5–5.2)
ALBUMIN/GLOB SERPL: 1.8 G/DL
ALP SERPL-CCNC: 68 U/L (ref 40–129)
ALT SERPL-CCNC: 40 U/L (ref 5–33)
AMYLASE SERPL-CCNC: 180 U/L (ref 28–100)
AST SERPL-CCNC: 24 U/L (ref 5–32)
BASOPHILS # BLD AUTO: 0.04 10*3/MM3 (ref 0–0.2)
BASOPHILS NFR BLD AUTO: 0.7 % (ref 0–2)
BILIRUB SERPL-MCNC: 0.3 MG/DL (ref 0.2–1.2)
BUN SERPL-MCNC: 8 MG/DL (ref 6–20)
BUN/CREAT SERPL: 13.1 (ref 7–25)
CALCIUM SERPL-MCNC: 9 MG/DL (ref 8.6–10.5)
CHLORIDE SERPL-SCNC: 102 MMOL/L (ref 98–107)
CO2 SERPL-SCNC: 27.1 MMOL/L (ref 22–29)
CREAT SERPL-MCNC: 0.61 MG/DL (ref 0.57–1)
EOSINOPHIL # BLD AUTO: 0.77 10*3/MM3 (ref 0.1–0.3)
EOSINOPHIL NFR BLD AUTO: 14.1 % (ref 0–4)
ERYTHROCYTE [DISTWIDTH] IN BLOOD BY AUTOMATED COUNT: 12.9 % (ref 11.5–14.5)
GFR SERPLBLD CREATININE-BSD FMLA CKD-EPI: 108 ML/MIN/1.73
GFR SERPLBLD CREATININE-BSD FMLA CKD-EPI: 130 ML/MIN/1.73
GLOBULIN SER CALC-MCNC: 2.2 GM/DL
GLUCOSE SERPL-MCNC: 79 MG/DL (ref 65–99)
HBA1C MFR BLD: 5.1 % (ref 4.8–5.6)
HCT VFR BLD AUTO: 40.3 % (ref 37–47)
HGB BLD-MCNC: 13.7 G/DL (ref 12–16)
IMM GRANULOCYTES # BLD: 0.01 10*3/MM3 (ref 0–0.03)
IMM GRANULOCYTES NFR BLD: 0.2 % (ref 0–0.5)
LIPASE SERPL-CCNC: 275 U/L (ref 13–60)
LYMPHOCYTES # BLD AUTO: 1.58 10*3/MM3 (ref 0.6–4.8)
LYMPHOCYTES NFR BLD AUTO: 28.9 % (ref 20–45)
MCH RBC QN AUTO: 30.9 PG (ref 27–31)
MCHC RBC AUTO-ENTMCNC: 34 G/DL (ref 31–37)
MCV RBC AUTO: 90.8 FL (ref 81–99)
MONOCYTES # BLD AUTO: 0.52 10*3/MM3 (ref 0–1)
MONOCYTES NFR BLD AUTO: 9.5 % (ref 3–8)
NEUTROPHILS # BLD AUTO: 2.55 10*3/MM3 (ref 1.5–8.3)
NEUTROPHILS NFR BLD AUTO: 46.6 % (ref 45–70)
NRBC BLD AUTO-RTO: 0 /100 WBC (ref 0–0)
PLATELET # BLD AUTO: 261 10*3/MM3 (ref 140–500)
POTASSIUM SERPL-SCNC: 4.5 MMOL/L (ref 3.5–5.2)
PROT SERPL-MCNC: 6.1 G/DL (ref 6–8.5)
RBC # BLD AUTO: 4.44 10*6/MM3 (ref 4.2–5.4)
SODIUM SERPL-SCNC: 138 MMOL/L (ref 136–145)
TSH SERPL DL<=0.005 MIU/L-ACNC: 1.76 MIU/ML (ref 0.27–4.2)
WBC # BLD AUTO: 5.47 10*3/MM3 (ref 4.8–10.8)

## 2017-12-08 PROCEDURE — 99214 OFFICE O/P EST MOD 30 MIN: CPT | Performed by: INTERNAL MEDICINE

## 2017-12-08 RX ORDER — OMEPRAZOLE 20 MG/1
20 CAPSULE, DELAYED RELEASE ORAL DAILY
Qty: 30 CAPSULE | Refills: 0 | Status: SHIPPED | OUTPATIENT
Start: 2017-12-08 | End: 2018-06-11

## 2017-12-08 NOTE — PROGRESS NOTES
Subjective     Kadi Gonzalez is a 42 y.o. female, who presents with a chief complaint of   Chief Complaint   Patient presents with   • Diarrhea       HPI   43yo female with soft stools twice daily since Thanksgiving.  Had a few solid bowel movements.  Initially with some louder Bs, which has resolved.  Drank heavily for a number of years, last time was beginning of October.  Light stools. Admits overdrinking and went to the [facility] for recent detox. No recent antibiotic at all.      No fever or chills.      She also reports back of head with some stiffness, had some pain in her upper back with cold, ran fever a few nights. Diarrhea was present before URI.  Also having some R sided pain in her RL back - thinks enlarging and would like it to check.     Still taking celexa, controlled pretty well, not currently on klonopin.    She stilll has sleep disturbance.    She does continue to smoke. Not exercising, having some more fatigued.     The following portions of the patient's history were reviewed and updated as appropriate: allergies, current medications, past family history, past medical history, past social history, past surgical history and problem list.    Allergies: Penicillins    Current Outpatient Prescriptions:   •  albuterol (ACCUNEB) 1.25 MG/3ML nebulizer solution, Use 4 times daily., Disp: 25 vial, Rfl: 2  •  citalopram (CeleXA) 20 MG tablet, Take 1 tablet by mouth Daily., Disp: 30 tablet, Rfl: 5  •  clonazePAM (KlonoPIN) 1 MG tablet, Take 1 tablet by mouth 2 (Two) Times a Day As Needed for Anxiety., Disp: 30 tablet, Rfl: 2  •  Fluticasone Furoate-Vilanterol 100-25 MCG/INH aerosol powder , Inhale., Disp: , Rfl:   •  hydrOXYzine (VISTARIL) 25 MG capsule, Take 25 mg by mouth 3 (Three) Times a Day As Needed for Itching., Disp: , Rfl:   •  omeprazole (PRILOSEC) 20 MG capsule, Take 1 capsule by mouth Daily., Disp: 30 capsule, Rfl: 0  •  traZODone (DESYREL) 100 MG tablet, Take 1 tablet by mouth Every Night., Disp:  "30 tablet, Rfl: 5  •  VENTOLIN  (90 BASE) MCG/ACT inhaler, INHALE 2 PUFFS BY MOUTH EVERY 6 HOURS AS NEEDED FOR WHEEZING, Disp: 18 g, Rfl: 5  There are no discontinued medications.    Review of Systems   Constitutional: Negative for appetite change, fatigue, fever and unexpected weight change.   HENT: Negative for sinus pressure and trouble swallowing.    Respiratory: Negative for cough and chest tightness.    Cardiovascular: Negative for chest pain, palpitations and leg swelling.   Gastrointestinal: Negative for constipation and diarrhea.   Genitourinary: Negative for dysuria, frequency, hematuria, pelvic pain and vaginal discharge.   Musculoskeletal: Negative.    Skin: Negative.    Neurological: Negative for dizziness, light-headedness and headaches.   Hematological: Negative.    Psychiatric/Behavioral: Negative.    All other systems reviewed and are negative.      Objective     /60  Pulse 89  Temp 97.8 °F (36.6 °C)  Resp 16  Ht 160 cm (63\")  Wt 86.2 kg (190 lb)  SpO2 98%  BMI 33.66 kg/m2      Physical Exam   Constitutional: She is oriented to person, place, and time. She appears well-developed and well-nourished.   HENT:   Head: Normocephalic and atraumatic.   Eyes: No scleral icterus.   Neck: No thyromegaly present.   Cardiovascular: Normal rate and regular rhythm.    No murmur heard.  Pulmonary/Chest: Effort normal and breath sounds normal. No respiratory distress.   Abdominal: Soft. She exhibits no distension and no mass. There is tenderness.   Tender over epigastrium more than L, neg rebound or guarding, organ size limited by habitus   Neurological: She is alert and oriented to person, place, and time.   Skin: Skin is warm and dry.   Psychiatric: She has a normal mood and affect. Her behavior is normal.   Nursing note and vitals reviewed.      Lab Results (most recent)     None          Results for orders placed or performed in visit on 05/17/17   Comprehensive metabolic panel   Result " Value Ref Range    Glucose 79 65 - 99 mg/dL    BUN 7 6 - 20 mg/dL    Creatinine 0.59 0.57 - 1.00 mg/dL    eGFR Non African Am 112 >60 mL/min/1.73    eGFR African Am 136 >60 mL/min/1.73    BUN/Creatinine Ratio 11.9 7.0 - 25.0    Sodium 137 136 - 145 mmol/L    Potassium 4.2 3.5 - 5.2 mmol/L    Chloride 95 (L) 98 - 107 mmol/L    Total CO2 26.9 22.0 - 29.0 mmol/L    Calcium 9.3 8.6 - 10.5 mg/dL    Total Protein 6.5 6.0 - 8.5 g/dL    Albumin 4.00 3.50 - 5.20 g/dL    Globulin 2.5 gm/dL    A/G Ratio 1.6 g/dL    Total Bilirubin 0.3 0.2 - 1.2 mg/dL    Alkaline Phosphatase 76 40 - 129 U/L    AST (SGOT) 39 (H) 5 - 32 U/L    ALT (SGPT) 30 5 - 33 U/L   CBC w AUTO Differential   Result Value Ref Range    WBC 5.59 4.80 - 10.80 10*3/mm3    RBC 4.65 4.20 - 5.40 10*6/mm3    Hemoglobin 14.6 12.0 - 16.0 g/dL    Hematocrit 42.8 37.0 - 47.0 %    MCV 92.0 81.0 - 99.0 fL    MCH 31.4 (H) 27.0 - 31.0 pg    MCHC 34.1 31.0 - 37.0 g/dL    RDW 14.2 11.5 - 14.5 %    Platelets 233 140 - 500 10*3/mm3    Neutrophil Rel % 62.8 45.0 - 70.0 %    Lymphocyte Rel % 27.9 20.0 - 45.0 %    Monocyte Rel % 7.3 3.0 - 8.0 %    Eosinophil Rel % 1.3 0.0 - 4.0 %    Basophil Rel % 0.5 0.0 - 2.0 %    Neutrophils Absolute 3.51 1.50 - 8.30 10*3/mm3    Lymphocytes Absolute 1.56 0.60 - 4.80 10*3/mm3    Monocytes Absolute 0.41 0.00 - 1.00 10*3/mm3    Eosinophils Absolute 0.07 (L) 0.10 - 0.30 10*3/mm3    Basophils Absolute 0.03 0.00 - 0.20 10*3/mm3    Immature Granulocyte Rel % 0.2 0.0 - 0.5 %    Immature Grans Absolute 0.01 0.00 - 0.03 10*3/mm3    nRBC 0.0 0.0 - 0.0 /100 WBC       Assessment/Plan   Kadi was seen today for diarrhea.    Diagnoses and all orders for this visit:    History of alcoholism    Diarrhea due to malabsorption  -     POCT occult blood x 1 stool  -     Ova & Parasite Examination - Stool, Per Rectum  -     Stool Culture - Stool, Per Rectum  -     Clostridium Difficile Toxin, PCR - Stool, Per Rectum  -     Amylase  -     Lipase    Chronic fatigue  -      CBC w AUTO Differential  -     TSH    Left lateral abdominal pain  -     Comprehensive metabolic panel  -     CBC w AUTO Differential  -     Helicobacter Pylori, IgA IgG IgM; Future  -     omeprazole (PRILOSEC) 20 MG capsule; Take 1 capsule by mouth Daily.    Hyperglycemia  -     Hemoglobin A1c    Spent 25 min in care of patient >50% discussing differential, need for possible colonsocopy if stool change beyond 6 weeks. Call sooner for more volume, bloody stools, persistent change.    Return in about 4 weeks (around 1/5/2018) for Recheck.    Matt Varner MD  12/08/2017

## 2017-12-08 NOTE — PATIENT INSTRUCTIONS
Assessment/Plan   Kadi was seen today for diarrhea.    Diagnoses and all orders for this visit:    History of alcoholism    Diarrhea due to malabsorption  -     POCT occult blood x 1 stool  -     Ova & Parasite Examination - Stool, Per Rectum  -     Stool Culture - Stool, Per Rectum  -     Clostridium Difficile Toxin, PCR - Stool, Per Rectum  -     Amylase  -     Lipase    Chronic fatigue  -     CBC w AUTO Differential  -     TSH    Left lateral abdominal pain  -     Comprehensive metabolic panel  -     CBC w AUTO Differential  -     Helicobacter Pylori, IgA IgG IgM; Future  -     omeprazole (PRILOSEC) 20 MG capsule; Take 1 capsule by mouth Daily.    Hyperglycemia  -     Hemoglobin A1c

## 2017-12-13 ENCOUNTER — RESULTS ENCOUNTER (OUTPATIENT)
Dept: INTERNAL MEDICINE | Facility: CLINIC | Age: 42
End: 2017-12-13

## 2017-12-13 DIAGNOSIS — R10.9 LEFT LATERAL ABDOMINAL PAIN: ICD-10-CM

## 2017-12-14 DIAGNOSIS — R74.8 ELEVATED AMYLASE AND LIPASE: ICD-10-CM

## 2017-12-14 DIAGNOSIS — R10.9 LEFT LATERAL ABDOMINAL PAIN: Primary | ICD-10-CM

## 2017-12-14 DIAGNOSIS — K90.9 DIARRHEA DUE TO MALABSORPTION: ICD-10-CM

## 2017-12-14 DIAGNOSIS — R19.7 DIARRHEA DUE TO MALABSORPTION: ICD-10-CM

## 2017-12-15 LAB
H PYLORI IGA SER-ACNC: <9 UNITS (ref 0–8.9)
H PYLORI IGG SER IA-ACNC: <0.9 U/ML (ref 0–0.8)
H PYLORI IGM SER-ACNC: <9 UNITS (ref 0–8.9)

## 2017-12-17 LAB
BACTERIA SPEC CULT: NORMAL
BACTERIA SPEC CULT: NORMAL
C DIFF TOX GENS STL QL NAA+PROBE: NORMAL
CAMPYLOBACTER STL CULT: NORMAL
E COLI SXT STL QL IA: NEGATIVE
O+P SPEC MICRO: NORMAL
O+P STL CONC: NORMAL
SALM + SHIG STL CULT: NORMAL
SPECIMEN STATUS: NORMAL

## 2017-12-18 ENCOUNTER — TELEPHONE (OUTPATIENT)
Dept: INTERNAL MEDICINE | Facility: CLINIC | Age: 42
End: 2017-12-18

## 2017-12-18 NOTE — TELEPHONE ENCOUNTER
-      Patient is aware---- Message from Matt Varner MD sent at 12/15/2017  5:00 PM EST -----  Bacteria testin is negative. Please see how she is doing, may need GI if persists

## 2017-12-19 ENCOUNTER — TELEPHONE (OUTPATIENT)
Dept: INTERNAL MEDICINE | Facility: CLINIC | Age: 42
End: 2017-12-19

## 2017-12-19 NOTE — TELEPHONE ENCOUNTER
Left message normal results call if questions-      ---- Message from Matt Varner MD sent at 12/19/2017  7:50 AM EST -----  Stool culture looks good.

## 2017-12-20 ENCOUNTER — HOSPITAL ENCOUNTER (OUTPATIENT)
Dept: CT IMAGING | Facility: HOSPITAL | Age: 42
Discharge: HOME | End: 2017-12-20
Attending: FAMILY MEDICINE | Admitting: FAMILY MEDICINE

## 2017-12-20 DIAGNOSIS — K90.9 DIARRHEA DUE TO MALABSORPTION: ICD-10-CM

## 2017-12-20 DIAGNOSIS — R19.7 DIARRHEA DUE TO MALABSORPTION: ICD-10-CM

## 2017-12-20 DIAGNOSIS — R74.8 ELEVATED AMYLASE AND LIPASE: ICD-10-CM

## 2017-12-20 DIAGNOSIS — R10.9 LEFT LATERAL ABDOMINAL PAIN: ICD-10-CM

## 2017-12-20 PROCEDURE — 74170 CT ABD WO CNTRST FLWD CNTRST: CPT

## 2017-12-20 PROCEDURE — 0 IOPAMIDOL PER 1 ML: Performed by: FAMILY MEDICINE

## 2017-12-20 RX ADMIN — IOPAMIDOL 100 ML: 755 INJECTION, SOLUTION INTRAVENOUS at 11:42

## 2017-12-27 ENCOUNTER — TELEPHONE (OUTPATIENT)
Dept: INTERNAL MEDICINE | Facility: CLINIC | Age: 42
End: 2017-12-27

## 2018-01-08 ENCOUNTER — OFFICE VISIT (OUTPATIENT)
Dept: INTERNAL MEDICINE | Facility: CLINIC | Age: 43
End: 2018-01-08

## 2018-01-08 VITALS
RESPIRATION RATE: 16 BRPM | DIASTOLIC BLOOD PRESSURE: 64 MMHG | SYSTOLIC BLOOD PRESSURE: 118 MMHG | BODY MASS INDEX: 35.4 KG/M2 | HEART RATE: 68 BPM | HEIGHT: 63 IN | OXYGEN SATURATION: 96 % | WEIGHT: 199.8 LBS

## 2018-01-08 DIAGNOSIS — G47.00 INSOMNIA, UNSPECIFIED TYPE: ICD-10-CM

## 2018-01-08 DIAGNOSIS — K85.20 ALCOHOL-INDUCED ACUTE PANCREATITIS, UNSPECIFIED COMPLICATION STATUS: ICD-10-CM

## 2018-01-08 DIAGNOSIS — F41.9 ANXIETY: Primary | ICD-10-CM

## 2018-01-08 DIAGNOSIS — F10.21 HISTORY OF ALCOHOLISM (HCC): ICD-10-CM

## 2018-01-08 PROCEDURE — 99214 OFFICE O/P EST MOD 30 MIN: CPT | Performed by: FAMILY MEDICINE

## 2018-01-08 RX ORDER — CITALOPRAM 40 MG/1
40 TABLET ORAL DAILY
Qty: 30 TABLET | Refills: 5 | Status: SHIPPED | OUTPATIENT
Start: 2018-01-08 | End: 2018-06-11 | Stop reason: SDUPTHER

## 2018-01-08 RX ORDER — HYDROXYZINE PAMOATE 25 MG/1
25 CAPSULE ORAL 3 TIMES DAILY PRN
Qty: 60 CAPSULE | Refills: 5 | Status: SHIPPED | OUTPATIENT
Start: 2018-01-08 | End: 2018-08-29 | Stop reason: SDUPTHER

## 2018-01-08 NOTE — PROGRESS NOTES
Subjective     Kadi Gonzalez is a 42 y.o. female, who presents with a chief complaint of   Chief Complaint   Patient presents with   • Anxiety       Anxiety   Symptoms include nervous/anxious behavior.       Pt here to f/u on recent medication changes.      1. Alcoholism.  She relapsed briefly but has been sober X 48 days.    2. Anxiety.  Tolerating citalopram.  Reports that her symptoms are controlled.  Denies SI. Taking citalopram rarely.    3. Insomnia.  Still taking trazodone and it is helping.    4. Pancreatitis. She was seen last month by Dr. Varner.  Symptoms have resolved.    The following portions of the patient's history were reviewed and updated as appropriate: allergies, current medications, past family history, past medical history, past social history, past surgical history and problem list.    Allergies: Penicillins    Review of Systems   Constitutional: Negative.    HENT: Negative.    Eyes: Negative.    Respiratory: Negative.    Cardiovascular: Negative.    Gastrointestinal: Negative.    Endocrine: Negative.    Genitourinary: Negative.    Musculoskeletal: Negative.    Skin: Negative.    Allergic/Immunologic: Negative.    Neurological: Negative.    Hematological: Negative.    Psychiatric/Behavioral: The patient is nervous/anxious.        Objective     Wt Readings from Last 3 Encounters:   01/08/18 90.6 kg (199 lb 12.8 oz)   12/08/17 86.2 kg (190 lb)   10/04/17 85.5 kg (188 lb 6.4 oz)     Temp Readings from Last 3 Encounters:   12/08/17 97.8 °F (36.6 °C)   10/04/17 98.3 °F (36.8 °C)   07/27/17 98.1 °F (36.7 °C)     BP Readings from Last 3 Encounters:   01/08/18 118/64   12/08/17 112/60   10/04/17 104/64     Pulse Readings from Last 3 Encounters:   01/08/18 68   12/08/17 89   07/27/17 89     Body mass index is 35.39 kg/(m^2).  SpO2 Readings from Last 3 Encounters:   01/08/18 96%   12/08/17 98%   07/27/17 98%       Physical Exam   Constitutional: She is oriented to person, place, and time. She appears  well-developed and well-nourished.   HENT:   Head: Normocephalic and atraumatic.   Eyes: Conjunctivae and EOM are normal. No scleral icterus.   Neck: Neck supple.   Cardiovascular: Normal rate, regular rhythm and normal heart sounds.    No murmur heard.  Pulmonary/Chest: Effort normal. No respiratory distress.   Abdominal: Soft. There is no hepatosplenomegaly. There is no tenderness.   Musculoskeletal: Normal range of motion. She exhibits no edema.   Lymphadenopathy:     She has no cervical adenopathy.   Neurological: She is alert and oriented to person, place, and time.   Skin: Skin is warm and dry. No rash noted.   Psychiatric: She has a normal mood and affect. Her behavior is normal.       Results for orders placed or performed in visit on 12/13/17   Helicobacter Pylori, IgA IgG IgM   Result Value Ref Range    H. pylori IgG <0.9 0.0 - 0.8 U/mL    H. pylori, IgA ABS <9.0 0.0 - 8.9 units    H. Pylori, IgM <9.0 0.0 - 8.9 units       Assessment/Plan   Kadi was seen today for anxiety.    Diagnoses and all orders for this visit:    Anxiety  -     citalopram (CeleXA) 40 MG tablet; Take 1 tablet by mouth Daily.  -     hydrOXYzine (VISTARIL) 25 MG capsule; Take 1 capsule by mouth 3 (Three) Times a Day As Needed for Itching.    History of alcoholism    Insomnia, unspecified type  -     hydrOXYzine (VISTARIL) 25 MG capsule; Take 1 capsule by mouth 3 (Three) Times a Day As Needed for Itching.    Alcohol-induced acute pancreatitis, unspecified complication status  -     Comprehensive Metabolic Panel  -     Amylase  -     Lipase      1. Anxiety. Doing well.  Continue citalopram.     2. Alcoholism.  Doing well.  Continue outpatient treatment and AA.    3. Insomnia.  Controlled with trazodone.    4. Pancreatitis.  Symptoms resolved.  Recheck labs today.      Outpatient Medications Prior to Visit   Medication Sig Dispense Refill   • albuterol (ACCUNEB) 1.25 MG/3ML nebulizer solution Use 4 times daily. 25 vial 2   • clonazePAM  (KlonoPIN) 1 MG tablet Take 1 tablet by mouth 2 (Two) Times a Day As Needed for Anxiety. 30 tablet 2   • Fluticasone Furoate-Vilanterol 100-25 MCG/INH aerosol powder  Inhale.     • omeprazole (PRILOSEC) 20 MG capsule Take 1 capsule by mouth Daily. 30 capsule 0   • VENTOLIN  (90 BASE) MCG/ACT inhaler INHALE 2 PUFFS BY MOUTH EVERY 6 HOURS AS NEEDED FOR WHEEZING 18 g 5   • citalopram (CeleXA) 20 MG tablet Take 1 tablet by mouth Daily. 30 tablet 5   • hydrOXYzine (VISTARIL) 25 MG capsule Take 25 mg by mouth 3 (Three) Times a Day As Needed for Itching.     • traZODone (DESYREL) 100 MG tablet Take 1 tablet by mouth Every Night. 30 tablet 5     No facility-administered medications prior to visit.      New Medications Ordered This Visit   Medications   • citalopram (CeleXA) 40 MG tablet     Sig: Take 1 tablet by mouth Daily.     Dispense:  30 tablet     Refill:  5     She would like to pick this up on 10/25/17.   • hydrOXYzine (VISTARIL) 25 MG capsule     Sig: Take 1 capsule by mouth 3 (Three) Times a Day As Needed for Itching.     Dispense:  60 capsule     Refill:  5     [unfilled]  Medications Discontinued During This Encounter   Medication Reason   • traZODone (DESYREL) 100 MG tablet    • citalopram (CeleXA) 20 MG tablet Reorder   • hydrOXYzine (VISTARIL) 25 MG capsule Reorder         Return in about 3 months (around 4/8/2018).

## 2018-06-11 ENCOUNTER — OFFICE VISIT (OUTPATIENT)
Dept: INTERNAL MEDICINE | Facility: CLINIC | Age: 43
End: 2018-06-11

## 2018-06-11 VITALS
RESPIRATION RATE: 16 BRPM | HEIGHT: 63 IN | WEIGHT: 208 LBS | OXYGEN SATURATION: 98 % | BODY MASS INDEX: 36.86 KG/M2 | HEART RATE: 80 BPM | TEMPERATURE: 98.8 F | SYSTOLIC BLOOD PRESSURE: 110 MMHG | DIASTOLIC BLOOD PRESSURE: 68 MMHG

## 2018-06-11 DIAGNOSIS — F10.21 HISTORY OF ALCOHOLISM (HCC): ICD-10-CM

## 2018-06-11 DIAGNOSIS — F41.9 ANXIETY: Primary | ICD-10-CM

## 2018-06-11 DIAGNOSIS — G47.00 INSOMNIA, UNSPECIFIED TYPE: ICD-10-CM

## 2018-06-11 DIAGNOSIS — F17.200 CURRENT SMOKER: ICD-10-CM

## 2018-06-11 DIAGNOSIS — J45.20 MILD INTERMITTENT ASTHMA WITHOUT COMPLICATION: ICD-10-CM

## 2018-06-11 PROBLEM — J18.9 PNEUMONIA DUE TO INFECTIOUS ORGANISM: Status: RESOLVED | Noted: 2017-05-02 | Resolved: 2018-06-11

## 2018-06-11 PROCEDURE — 99214 OFFICE O/P EST MOD 30 MIN: CPT | Performed by: FAMILY MEDICINE

## 2018-06-11 RX ORDER — CITALOPRAM 40 MG/1
40 TABLET ORAL DAILY
Qty: 30 TABLET | Refills: 5 | Status: SHIPPED | OUTPATIENT
Start: 2018-06-11 | End: 2018-12-24 | Stop reason: SDUPTHER

## 2018-06-11 RX ORDER — AMITRIPTYLINE HYDROCHLORIDE 25 MG/1
25 TABLET, FILM COATED ORAL NIGHTLY
Qty: 30 TABLET | Refills: 5 | Status: SHIPPED | OUTPATIENT
Start: 2018-06-11 | End: 2019-06-24 | Stop reason: SDUPTHER

## 2018-06-11 NOTE — PROGRESS NOTES
Subjective     Kadi Gonzalez is a 42 y.o. female, who presents with a chief complaint of   Chief Complaint   Patient presents with   • Anxiety       Anxiety   Symptoms include nervous/anxious behavior.       1. Asthma.  With the recent humidity, she has had mild flare; has used albuterol twice this past week, never during the middle of the night.    2. Anxiety.  Tolerating citalopram.  Reports that her symptoms are well-controlled.  Denies SI.     3. Insomnia.  She would like to restart a medication.    The following portions of the patient's history were reviewed and updated as appropriate: allergies, current medications, past family history, past medical history, past social history, past surgical history and problem list.    Allergies: Penicillins    Review of Systems   Constitutional: Negative.    HENT: Negative.    Eyes: Negative.    Respiratory: Negative.    Cardiovascular: Negative.    Gastrointestinal: Negative.    Endocrine: Negative.    Genitourinary: Negative.    Musculoskeletal: Negative.    Skin: Negative.    Allergic/Immunologic: Negative.    Neurological: Negative.    Hematological: Negative.    Psychiatric/Behavioral: The patient is nervous/anxious.        Objective     Wt Readings from Last 3 Encounters:   06/11/18 94.3 kg (208 lb)   01/08/18 90.6 kg (199 lb 12.8 oz)   12/08/17 86.2 kg (190 lb)     Temp Readings from Last 3 Encounters:   06/11/18 98.8 °F (37.1 °C)   03/01/18 98.4 °F (36.9 °C)   12/08/17 97.8 °F (36.6 °C)     BP Readings from Last 3 Encounters:   06/11/18 110/68   03/01/18 115/74   01/08/18 118/64     Pulse Readings from Last 3 Encounters:   06/11/18 80   03/01/18 71   01/08/18 68     Body mass index is 36.85 kg/m².  SpO2 Readings from Last 3 Encounters:   01/08/18 96%   12/08/17 98%   07/27/17 98%       Physical Exam   Constitutional: She is oriented to person, place, and time. She appears well-developed and well-nourished.   HENT:   Head: Normocephalic and atraumatic.   Eyes:  Conjunctivae and EOM are normal. No scleral icterus.   Neck: Neck supple.   Cardiovascular: Normal rate, regular rhythm and normal heart sounds.    No murmur heard.  Pulmonary/Chest: Effort normal. No respiratory distress.   Abdominal: Soft. There is no hepatosplenomegaly. There is no tenderness.   Musculoskeletal: Normal range of motion. She exhibits no edema.   Lymphadenopathy:     She has no cervical adenopathy.   Neurological: She is alert and oriented to person, place, and time.   Skin: Skin is warm and dry. No rash noted.   Psychiatric: She has a normal mood and affect. Her behavior is normal.   Nursing note and vitals reviewed.      Results for orders placed or performed in visit on 12/13/17   Helicobacter Pylori, IgA IgG IgM   Result Value Ref Range    H. pylori IgG <0.9 0.0 - 0.8 U/mL    H. pylori, IgA ABS <9.0 0.0 - 8.9 units    H. Pylori, IgM <9.0 0.0 - 8.9 units       Assessment/Plan   Kadi was seen today for anxiety.    Diagnoses and all orders for this visit:    Anxiety    Insomnia, unspecified type    Current smoker    Mild intermittent asthma without complication      1. Anxiety. Doing well.  Continue citalopram.     2.  Asthma.  Continue albuterol prn.  More frequent use would indicate need for daily controller.    3. Insomnia.  Start amitriptyline.    4. Smoking.  She will consider quitting after her daughter gets .      Outpatient Medications Prior to Visit   Medication Sig Dispense Refill   • citalopram (CeleXA) 40 MG tablet Take 1 tablet by mouth Daily. 30 tablet 5   • hydrOXYzine (VISTARIL) 25 MG capsule Take 1 capsule by mouth 3 (Three) Times a Day As Needed for Itching. 60 capsule 5   • VENTOLIN  (90 BASE) MCG/ACT inhaler INHALE 2 PUFFS BY MOUTH EVERY 6 HOURS AS NEEDED FOR WHEEZING 18 g 5   • albuterol (ACCUNEB) 1.25 MG/3ML nebulizer solution Use 4 times daily. 25 vial 2   • clonazePAM (KlonoPIN) 1 MG tablet Take 1 tablet by mouth 2 (Two) Times a Day As Needed for Anxiety. 30  tablet 2   • doxycycline (MONODOX) 100 MG capsule Take 1 capsule by mouth 2 (Two) Times a Day. 20 capsule 0   • Fluticasone Furoate-Vilanterol 100-25 MCG/INH aerosol powder  Inhale.     • guaifenesin-codeine (GUAIFENESIN AC) 100-10 MG/5ML liquid Take 5 mL by mouth At Night As Needed for Cough. 180 mL 0   • omeprazole (PRILOSEC) 20 MG capsule Take 1 capsule by mouth Daily. 30 capsule 0     No facility-administered medications prior to visit.      No orders of the defined types were placed in this encounter.    [unfilled]  Medications Discontinued During This Encounter   Medication Reason   • omeprazole (PRILOSEC) 20 MG capsule *Therapy completed   • albuterol (ACCUNEB) 1.25 MG/3ML nebulizer solution *Therapy completed   • Fluticasone Furoate-Vilanterol 100-25 MCG/INH aerosol powder  *Therapy completed   • doxycycline (MONODOX) 100 MG capsule *Therapy completed   • guaifenesin-codeine (GUAIFENESIN AC) 100-10 MG/5ML liquid *Therapy completed   • clonazePAM (KlonoPIN) 1 MG tablet *Therapy completed         Return in about 6 months (around 12/11/2018).

## 2018-08-29 DIAGNOSIS — G47.00 INSOMNIA, UNSPECIFIED TYPE: ICD-10-CM

## 2018-08-29 DIAGNOSIS — F41.9 ANXIETY: ICD-10-CM

## 2018-08-29 RX ORDER — HYDROXYZINE PAMOATE 25 MG/1
CAPSULE ORAL
Qty: 60 CAPSULE | Refills: 1 | Status: SHIPPED | OUTPATIENT
Start: 2018-08-29 | End: 2019-06-24 | Stop reason: SDUPTHER

## 2018-12-09 DIAGNOSIS — J45.41 ASTHMATIC BRONCHITIS, MODERATE PERSISTENT, WITH ACUTE EXACERBATION: ICD-10-CM

## 2018-12-10 RX ORDER — ALBUTEROL SULFATE 1.25 MG/3ML
SOLUTION RESPIRATORY (INHALATION)
Qty: 75 ML | Refills: 5 | OUTPATIENT
Start: 2018-12-10 | End: 2020-03-10

## 2018-12-12 ENCOUNTER — OFFICE VISIT (OUTPATIENT)
Dept: INTERNAL MEDICINE | Facility: CLINIC | Age: 43
End: 2018-12-12

## 2018-12-12 VITALS
BODY MASS INDEX: 41.04 KG/M2 | TEMPERATURE: 99 F | HEIGHT: 62 IN | DIASTOLIC BLOOD PRESSURE: 64 MMHG | OXYGEN SATURATION: 98 % | SYSTOLIC BLOOD PRESSURE: 118 MMHG | WEIGHT: 223 LBS | RESPIRATION RATE: 16 BRPM | HEART RATE: 85 BPM

## 2018-12-12 DIAGNOSIS — J40 BRONCHITIS: Primary | ICD-10-CM

## 2018-12-12 DIAGNOSIS — F17.200 CURRENT SMOKER: ICD-10-CM

## 2018-12-12 PROCEDURE — 99213 OFFICE O/P EST LOW 20 MIN: CPT | Performed by: FAMILY MEDICINE

## 2018-12-12 RX ORDER — AZITHROMYCIN 250 MG/1
TABLET, FILM COATED ORAL
Qty: 6 TABLET | Refills: 0 | Status: SHIPPED | OUTPATIENT
Start: 2018-12-12 | End: 2019-01-18

## 2018-12-12 RX ORDER — BENZONATATE 200 MG/1
200 CAPSULE ORAL 3 TIMES DAILY PRN
Qty: 30 CAPSULE | Refills: 0 | Status: SHIPPED | OUTPATIENT
Start: 2018-12-12 | End: 2019-05-15 | Stop reason: SDUPTHER

## 2018-12-12 NOTE — PROGRESS NOTES
Kadi Gonzalez is a 43 y.o. female, who presents with a chief complaint of   Chief Complaint   Patient presents with   • Cough   • URI     drainage, green phlegm, x on and off 3 months       HPI     Pt presents with the complaint of one week of coughing fits, congestion, post nasal drip, sore throat, hoarse voice, low grade fevers.  No chills, nausea, or vomiting.  The cough is interfering with work and sleep.  She has been using albuterol nebs and Dimetapp.    Smoking.  Pt has chosen a quit date as January 7th.      The following portions of the patient's history were reviewed and updated as appropriate: allergies, current medications, past family history, past medical history, past social history, past surgical history and problem list.    Allergies: Penicillins    Review of Systems   HENT: Positive for congestion, postnasal drip, rhinorrhea and sore throat.    Eyes: Negative.    Respiratory: Positive for cough. Negative for shortness of breath and wheezing.    Cardiovascular: Negative.    Gastrointestinal: Negative.    Endocrine: Negative.    Genitourinary: Negative.    Musculoskeletal: Negative.    Skin: Negative.    Allergic/Immunologic: Positive for environmental allergies.   Neurological: Negative.    Hematological: Negative.    Psychiatric/Behavioral: Negative.              Wt Readings from Last 3 Encounters:   12/12/18 101 kg (223 lb)   09/09/18 90.7 kg (200 lb)   06/11/18 94.3 kg (208 lb)     Temp Readings from Last 3 Encounters:   12/12/18 99 °F (37.2 °C)   09/09/18 99 °F (37.2 °C) (Temporal Artery )   06/11/18 98.8 °F (37.1 °C)     BP Readings from Last 3 Encounters:   12/12/18 118/64   09/09/18 121/81   06/11/18 110/68     Pulse Readings from Last 3 Encounters:   12/12/18 85   09/09/18 78   06/11/18 80     Body mass index is 40.79 kg/m².  @LASTSAO2(3)@    Physical Exam   Constitutional: She is oriented to person, place, and time. She appears well-developed and well-nourished.   HENT:   Head:  Normocephalic and atraumatic.   Right Ear: Tympanic membrane normal.   Left Ear: Tympanic membrane normal.   Eyes: Conjunctivae and EOM are normal.   Neck: Neck supple. No thyromegaly present.   Cardiovascular: Normal rate, regular rhythm and normal heart sounds.   Pulmonary/Chest: Effort normal. She has no wheezes.   Musculoskeletal: Normal range of motion. She exhibits no edema.   Neurological: She is alert and oriented to person, place, and time.   Skin: Skin is warm and dry.   Psychiatric: She has a normal mood and affect. Her behavior is normal.   Nursing note and vitals reviewed.      Results for orders placed or performed in visit on 12/13/17   Helicobacter Pylori, IgA IgG IgM   Result Value Ref Range    H. pylori IgG <0.9 0.0 - 0.8 U/mL    H. pylori, IgA ABS <9.0 0.0 - 8.9 units    H. Pylori, IgM <9.0 0.0 - 8.9 units           Kadi was seen today for cough and uri.    Diagnoses and all orders for this visit:    Bronchitis  -     azithromycin (ZITHROMAX) 250 MG tablet; Take 2 tablets the first day, then 1 tablet daily for 4 days.  -     HYDROcodone-homatropine (HYCODAN) 5-1.5 MG/5ML syrup; Take 5 mL by mouth Every 6 (Six) Hours As Needed for Cough.  -     benzonatate (TESSALON) 200 MG capsule; Take 1 capsule by mouth 3 (Three) Times a Day As Needed for Cough.    Current smoker  -     varenicline (CHANTIX STARTING MONTH PAK) 0.5 MG X 11 & 1 MG X 42 tablet; Take 0.5 mg one daily on days 1-3 and and 0.5 mg twice daily on days 4-7.Then 1 mg twice daily for a total of 12 weeks.    1. Bronchitis.  Start antibiotics if she hasn't turned a corner in 2-3 more days; (hold citalopram).  Cough suppressants and symptomatic treatment.  Warning s/sxs discussed.    2. Smoking.  Pt would like to quit.  Pt counseled.  Start Chantix.      Outpatient Medications Prior to Visit   Medication Sig Dispense Refill   • albuterol (ACCUNEB) 1.25 MG/3ML nebulizer solution USE 1 VIAL VIA NEBULIZER FOUR TIMES DAILY 75 mL 5   •  amitriptyline (ELAVIL) 25 MG tablet Take 1 tablet by mouth Every Night. 30 tablet 5   • citalopram (CeleXA) 40 MG tablet Take 1 tablet by mouth Daily. 30 tablet 5   • hydrOXYzine (VISTARIL) 25 MG capsule TAKE 1 CAPSULE BY MOUTH THREE TIMES DAILY AS NEEDED FOR ITCHING 60 capsule 1   • VENTOLIN  (90 BASE) MCG/ACT inhaler INHALE 2 PUFFS BY MOUTH EVERY 6 HOURS AS NEEDED FOR WHEEZING 18 g 5   • HYDROcod Polst-CPM Polst ER (TUSSIONEX PENNKINETIC ER) 10-8 MG/5ML ER suspension Take 5 mL by mouth Every 12 (Twelve) Hours As Needed for Cough. 115 mL 0   • cefdinir (OMNICEF) 300 MG capsule Take 1 capsule by mouth 2 (Two) Times a Day. 20 capsule 0   • MethylPREDNISolone (MEDROL, LOLI,) 4 MG tablet Take as directed on package instructions. 21 each 0     No facility-administered medications prior to visit.      New Medications Ordered This Visit   Medications   • azithromycin (ZITHROMAX) 250 MG tablet     Sig: Take 2 tablets the first day, then 1 tablet daily for 4 days.     Dispense:  6 tablet     Refill:  0   • varenicline (CHANTIX STARTING MONTH PAK) 0.5 MG X 11 & 1 MG X 42 tablet     Sig: Take 0.5 mg one daily on days 1-3 and and 0.5 mg twice daily on days 4-7.Then 1 mg twice daily for a total of 12 weeks.     Dispense:  53 tablet     Refill:  5     Refill continuing dose packs.   • HYDROcodone-homatropine (HYCODAN) 5-1.5 MG/5ML syrup     Sig: Take 5 mL by mouth Every 6 (Six) Hours As Needed for Cough.     Dispense:  120 mL     Refill:  0   • benzonatate (TESSALON) 200 MG capsule     Sig: Take 1 capsule by mouth 3 (Three) Times a Day As Needed for Cough.     Dispense:  30 capsule     Refill:  0     [unfilled]  Medications Discontinued During This Encounter   Medication Reason   • cefdinir (OMNICEF) 300 MG capsule *Therapy completed   • HYDROcod Polst-CPM Polst ER (TUSSIONEX PENNKINETIC ER) 10-8 MG/5ML ER suspension *Therapy completed   • MethylPREDNISolone (MEDROL, LOLI,) 4 MG tablet *Therapy completed         Return  if symptoms worsen or fail to improve.

## 2018-12-24 DIAGNOSIS — F41.9 ANXIETY: ICD-10-CM

## 2018-12-24 DIAGNOSIS — J45.909 UNCOMPLICATED ASTHMA: ICD-10-CM

## 2018-12-26 RX ORDER — ALBUTEROL SULFATE 90 UG/1
AEROSOL, METERED RESPIRATORY (INHALATION)
Qty: 18 G | Refills: 0 | Status: SHIPPED | OUTPATIENT
Start: 2018-12-26 | End: 2019-12-04 | Stop reason: SDUPTHER

## 2018-12-26 RX ORDER — CITALOPRAM 40 MG/1
40 TABLET ORAL DAILY
Qty: 30 TABLET | Refills: 0 | Status: SHIPPED | OUTPATIENT
Start: 2018-12-26 | End: 2019-01-18 | Stop reason: SDUPTHER

## 2019-01-18 DIAGNOSIS — F41.9 ANXIETY: ICD-10-CM

## 2019-01-18 RX ORDER — CITALOPRAM 40 MG/1
40 TABLET ORAL DAILY
Qty: 90 TABLET | Refills: 1 | Status: SHIPPED | OUTPATIENT
Start: 2019-01-18 | End: 2019-09-03 | Stop reason: SDUPTHER

## 2019-03-07 ENCOUNTER — TELEPHONE (OUTPATIENT)
Dept: OBSTETRICS AND GYNECOLOGY | Age: 44
End: 2019-03-07

## 2019-05-15 ENCOUNTER — PROCEDURE VISIT (OUTPATIENT)
Dept: OBSTETRICS AND GYNECOLOGY | Age: 44
End: 2019-05-15

## 2019-05-15 ENCOUNTER — OFFICE VISIT (OUTPATIENT)
Dept: OBSTETRICS AND GYNECOLOGY | Age: 44
End: 2019-05-15

## 2019-05-15 DIAGNOSIS — N93.9 ABNORMAL UTERINE BLEEDING (AUB): Primary | ICD-10-CM

## 2019-05-15 DIAGNOSIS — N92.6 IRREGULAR MENSTRUAL BLEEDING: Primary | ICD-10-CM

## 2019-05-15 DIAGNOSIS — Z01.812 PRE-PROCEDURE LAB EXAM: ICD-10-CM

## 2019-05-15 DIAGNOSIS — Z11.3 SCREEN FOR STD (SEXUALLY TRANSMITTED DISEASE): ICD-10-CM

## 2019-05-15 DIAGNOSIS — F17.200 CURRENT SMOKER: ICD-10-CM

## 2019-05-15 DIAGNOSIS — R73.02 IMPAIRED GLUCOSE TOLERANCE: ICD-10-CM

## 2019-05-15 LAB
B-HCG UR QL: NEGATIVE
INTERNAL NEGATIVE CONTROL: NEGATIVE
INTERNAL POSITIVE CONTROL: POSITIVE
Lab: NORMAL

## 2019-05-15 PROCEDURE — 81025 URINE PREGNANCY TEST: CPT | Performed by: OBSTETRICS & GYNECOLOGY

## 2019-05-15 PROCEDURE — 99203 OFFICE O/P NEW LOW 30 MIN: CPT | Performed by: OBSTETRICS & GYNECOLOGY

## 2019-05-15 PROCEDURE — 76830 TRANSVAGINAL US NON-OB: CPT | Performed by: OBSTETRICS & GYNECOLOGY

## 2019-05-15 PROCEDURE — 58100 BIOPSY OF UTERUS LINING: CPT | Performed by: OBSTETRICS & GYNECOLOGY

## 2019-05-15 RX ORDER — VARENICLINE TARTRATE 1 MG/1
TABLET, FILM COATED ORAL
Refills: 4 | COMMUNITY
Start: 2019-04-22 | End: 2020-03-10

## 2019-05-15 NOTE — PROGRESS NOTES
"Subjective     Chief Complaint   Patient presents with   • Gynecologic Exam     annual exam       Kadi Gonzalez is a 43 y.o.  whose LMP is Patient's last menstrual period was 2019 (exact date). presents for New Gyn. C/o AUB for 6 mo. Prior to this had normal menses. Now has bleeding    2-5 weeks w/ menorrhagia and intermenstrual bleeding/spotting. C/o 100 lb weight gain over last 10 years. Pt is  and is newly sexually active again and has had unprotected intercourse.  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 +tobacco use 1ppd (desires to quit and is on chantix)      No Additional Complaints Reported    The following portions of the patient's history were reviewed and updated as appropriate:vital signs, allergies, current medications, past family history, past medical history, past social history, past surgical history and problem list      Review of Systems   A comprehensive review of systems was negative except for: Genitourinary: positive for abnormal menstrual periods     Objective      /82   Ht 157.5 cm (62\")   Wt 99.5 kg (219 lb 6.4 oz)   LMP 2019 (Exact Date)   Breastfeeding? No   BMI 40.13 kg/m²     Physical Exam    General:   alert, appears stated age and no distress   Heart:    Lungs:    Breast:    Neck:    Abdomen:    CVA:    Pelvis: Vulva and vagina appear normal. Bimanual exam reveals normal uterus and adnexa.  External genitalia: normal general appearance  Cervix: normal appearance  Adnexa: normal bimanual exam  Uterus: normal single, nontender and anteverted   Extremities: Extremities normal, atraumatic, no cyanosis or edema   Neurologic: negative   Psychiatric: Normal affect, judgement, and mood       Lab Review   Labs: Urine pregnancy test negative    Imaging   Ultrasound - Pelvic Vaginal  Uterus 7.8 x 6 x 4 cm, EML=7.6 mm, normal ovaries with small follicles    Assessment/Plan     ASSESSMENT  1. Abnormal uterine bleeding (AUB)    2. " Screen for STD (sexually transmitted disease)    3. Pre-procedure lab exam    4. Current smoker    5. Impaired glucose tolerance        PLAN  1.   Orders Placed This Encounter   Procedures   • Chlamydia trachomatis, Neisseria gonorrhoeae, PCR - Urine, Urine, Clean Catch   • HIV-1 / O / 2 Ag / Antibody 4th Generation   • Hepatitis B Surface Antigen   • Hepatitis C Antibody   • RPR   • TSH   • Hemoglobin A1c   • POC Pregnancy, Urine   • Endometrial biopsy       2. Procedure- informed consent obtained for endometrial biopsy. Verbal consent obtained. Cervix prepped with betadine. Tenaculum used for retractions. Endometrial biopsy performed with pipelle in one pass. Tissue to pathology. Pt tolerated well. Tenaculum site hemostatic. Recommend OTC ibuprofen prn cramping.    3. I advised Kadi of the risks of continuing to use tobacco, and I provided her with tobacco cessation educational materials.   4. Likely has anovulatory bleeding related to obesity. If endometrial biopsy benign, recommend progesterone therapy. Unable to use ocps due to smoking. Would recommend progesterone containing IUD for contraception and cycle control. Info given. Pt will consider. Recommend weight loss.    During this visit, I spent 3 minutes counseling the patient regarding tobacco cessation.    Follow up: soon for annual with pap    Gia Garcia MD  5/16/2019

## 2019-05-16 VITALS
BODY MASS INDEX: 40.37 KG/M2 | HEIGHT: 62 IN | WEIGHT: 219.4 LBS | SYSTOLIC BLOOD PRESSURE: 126 MMHG | DIASTOLIC BLOOD PRESSURE: 82 MMHG

## 2019-05-16 LAB
HBA1C MFR BLD: 5.5 % (ref 4.8–5.6)
HBV SURFACE AG SERPL QL IA: NEGATIVE
HCV AB S/CO SERPL IA: <0.1 S/CO RATIO (ref 0–0.9)
HIV 1+2 AB+HIV1 P24 AG SERPL QL IA: NON REACTIVE
RPR SER QL: NORMAL
TSH SERPL DL<=0.005 MIU/L-ACNC: 2.2 MIU/ML (ref 0.27–4.2)

## 2019-05-17 ENCOUNTER — OFFICE VISIT (OUTPATIENT)
Dept: SPORTS MEDICINE | Facility: CLINIC | Age: 44
End: 2019-05-17

## 2019-05-17 ENCOUNTER — TELEPHONE (OUTPATIENT)
Dept: OBSTETRICS AND GYNECOLOGY | Age: 44
End: 2019-05-17

## 2019-05-17 VITALS
WEIGHT: 219 LBS | HEIGHT: 62 IN | BODY MASS INDEX: 40.3 KG/M2 | DIASTOLIC BLOOD PRESSURE: 82 MMHG | SYSTOLIC BLOOD PRESSURE: 126 MMHG

## 2019-05-17 DIAGNOSIS — M25.512 ACUTE PAIN OF LEFT SHOULDER: ICD-10-CM

## 2019-05-17 DIAGNOSIS — M25.552 PAIN IN JOINT OF LEFT HIP: Primary | ICD-10-CM

## 2019-05-17 DIAGNOSIS — M16.12 ARTHROPATHY OF LEFT HIP: ICD-10-CM

## 2019-05-17 PROCEDURE — 99214 OFFICE O/P EST MOD 30 MIN: CPT | Performed by: FAMILY MEDICINE

## 2019-05-17 PROCEDURE — 20611 DRAIN/INJ JOINT/BURSA W/US: CPT | Performed by: FAMILY MEDICINE

## 2019-05-17 PROCEDURE — 73502 X-RAY EXAM HIP UNI 2-3 VIEWS: CPT | Performed by: FAMILY MEDICINE

## 2019-05-17 RX ORDER — TRIAMCINOLONE ACETONIDE 40 MG/ML
80 INJECTION, SUSPENSION INTRA-ARTICULAR; INTRAMUSCULAR ONCE
Status: COMPLETED | OUTPATIENT
Start: 2019-05-17 | End: 2019-05-17

## 2019-05-17 RX ADMIN — TRIAMCINOLONE ACETONIDE 80 MG: 40 INJECTION, SUSPENSION INTRA-ARTICULAR; INTRAMUSCULAR at 10:30

## 2019-05-17 NOTE — PROGRESS NOTES
"Kadi is a 43 y.o. year old female    Chief Complaint   Patient presents with   • Left Hip - Pain     Pain started mid April, getting worse, difficult to walk, bear weight.     Pain not radiating    • Left Shoulder - Pain     Traumatic injury a couple years ago.     Pain not radiating.        History of Present Illness  1.  Left hip pain that began mid April with no known injury.  Pain has been progressively worsening to the point where she has walk with a limp due to the pain.  Anterior in location.  Does not radiate.  Dull and deep to the touch.  She has tried over-the-counter medication including 800 mg ibuprofen with minimal resolve.  There is requesting injection.    2.  Left shoulder pain that began approximately the same time.  No known injury.  History of traumatic shoulder injury which required surgery 20+ years ago.  Has associated limited range of motion, pain but no weakness.    I have reviewed the patient's medical, family, and social history in detail and updated the computerized patient record.    Review of Systems  Constitutional: Negative for fever.   Musculoskeletal:        Per HPI   Skin: Negative for rash.   Neurological: Negative for weakness and numbness.   Psychiatric/Behavioral: Negative for sleep disturbance.   All other systems reviewed and are negative.    /82   Ht 157.5 cm (62\")   Wt 99.3 kg (219 lb)   LMP 05/05/2019 (Exact Date)   BMI 40.06 kg/m²      Physical Exam    Vital signs reviewed.   General: No acute distress.  Eyes: conjunctiva clear; pupils equally round and reactive  ENT: external ears and nose atraumatic; oropharynx clear  CV: no peripheral edema, 2+ distal pulses  Resp: normal respiratory effort, no use of accessory muscles  Skin: no rashes or wounds; normal turgor  Psych: mood and affect appropriate; recent and remote memory intact  Neuro: sensation to light touch intact    MSK Exam:    Left hip: Limited range of motion with internal rotation that elicits pain.  " "Normal external rotation.  Equivocal logroll.  Right hip is unremarkable.  Left shoulder demonstrates full range of motion.  Negative drop arm.  Positive empty can, positive [place].    Left Hip X-Ray  Indication: Pain  AP and Frog Leg views    Findings:  No fracture  Anterior superior osteophyte formation of acetabular rim  Normal soft tissues  Normal joint spaces    No prior studies were available for comparison.    Ultrasound-Guided Hip Injection Procedure Note    Left hip injection was discussed with the patient in detail, including indication, risks, benefits, and alternatives. Verbal consent was given for the procedure. Injection was performed by physician.  Injection site was identified by ultrasound examination, then cleaned with Betadine and alcohol swabs. Prior to needle insertion, ethyl chloride spray was used for surface anesthesia. Sterile technique was used. Ultrasound guidance was indicated due to proximity of neurovascular structures and injection accuracy.  A 22-gauge, 3.5\" spinal needle was guided to the anterior joint capsule under continuous direct ultrasound visualization. Injectate was seen filing the capsule and passed without difficulty. The needle was removed and a simple bandage was applied. The procedure was tolerated well without difficulty.    Injection mixture:  1% lidocaine without epinephrine: 1 mL  40 mg/mL triamcinolone acetonide: 2 mL    Diagnoses and all orders for this visit:    Pain in joint of left hip  -     XR Hip With or Without Pelvis 2 - 3 View Left  -     triamcinolone acetonide (KENALOG-40) injection 80 mg    Arthropathy of left hip  -     triamcinolone acetonide (KENALOG-40) injection 80 mg    Acute pain of left shoulder        1, 2.  Discussed differential of presenting complaint.  Hip arthropathy based on history and exam as well as x-ray findings.  Injection as documented above.  3.  Discussed differential.  Home exercise program given.    EMR Dragon/Transcription " disclaimer:    Much of this encounter note is an electronic transcription/translation of spoken language to printed text.  The electronic translation of spoken language may permit erroneous, or at times, nonsensical words or phrases to be inadvertently transcribed.  Although I have reviewed the note for such errors some may still exist.

## 2019-05-17 NOTE — TELEPHONE ENCOUNTER
----- Message from Gia Garcia MD sent at 5/16/2019  8:54 AM EDT -----  HIV, hepatitis B&C, and syphilis negative  Thyroid labs normal  Hemoglobin A1C not in diabetic range

## 2019-05-18 LAB
C TRACH RRNA SPEC QL NAA+PROBE: NEGATIVE
N GONORRHOEA RRNA SPEC QL NAA+PROBE: NEGATIVE

## 2019-05-22 DIAGNOSIS — N71.1 CHRONIC ENDOMETRITIS: Primary | ICD-10-CM

## 2019-05-22 LAB
DX ICD CODE: NORMAL
PATH REPORT.FINAL DX SPEC: NORMAL
PATH REPORT.GROSS SPEC: NORMAL
PATH REPORT.RELEVANT HX SPEC: NORMAL
PATH REPORT.SITE OF ORIGIN SPEC: NORMAL
PATHOLOGIST NAME: NORMAL
PAYMENT PROCEDURE: NORMAL

## 2019-05-22 RX ORDER — METRONIDAZOLE 500 MG/1
500 TABLET ORAL 2 TIMES DAILY
Qty: 14 TABLET | Refills: 0 | Status: SHIPPED | OUTPATIENT
Start: 2019-05-22 | End: 2019-05-29

## 2019-06-24 DIAGNOSIS — F41.9 ANXIETY: ICD-10-CM

## 2019-06-24 DIAGNOSIS — G47.00 INSOMNIA, UNSPECIFIED TYPE: ICD-10-CM

## 2019-06-25 RX ORDER — AMITRIPTYLINE HYDROCHLORIDE 25 MG/1
25 TABLET, FILM COATED ORAL NIGHTLY
Qty: 90 TABLET | Refills: 1 | Status: SHIPPED | OUTPATIENT
Start: 2019-06-25 | End: 2020-01-13

## 2019-06-25 RX ORDER — HYDROXYZINE PAMOATE 25 MG/1
CAPSULE ORAL
Qty: 60 CAPSULE | Refills: 0 | OUTPATIENT
Start: 2019-06-25 | End: 2020-03-10

## 2019-07-25 ENCOUNTER — APPOINTMENT (OUTPATIENT)
Dept: WOMENS IMAGING | Facility: HOSPITAL | Age: 44
End: 2019-07-25

## 2019-07-25 ENCOUNTER — PROCEDURE VISIT (OUTPATIENT)
Dept: OBSTETRICS AND GYNECOLOGY | Age: 44
End: 2019-07-25

## 2019-07-25 DIAGNOSIS — Z12.31 VISIT FOR SCREENING MAMMOGRAM: Primary | ICD-10-CM

## 2019-07-25 PROCEDURE — 77067 SCR MAMMO BI INCL CAD: CPT | Performed by: RADIOLOGY

## 2019-07-25 PROCEDURE — 77067 SCR MAMMO BI INCL CAD: CPT | Performed by: OBSTETRICS & GYNECOLOGY

## 2019-08-07 ENCOUNTER — OFFICE VISIT (OUTPATIENT)
Dept: OBSTETRICS AND GYNECOLOGY | Age: 44
End: 2019-08-07

## 2019-08-07 VITALS
BODY MASS INDEX: 43.43 KG/M2 | HEIGHT: 62 IN | WEIGHT: 236 LBS | DIASTOLIC BLOOD PRESSURE: 72 MMHG | SYSTOLIC BLOOD PRESSURE: 128 MMHG

## 2019-08-07 DIAGNOSIS — N93.9 ABNORMAL UTERINE BLEEDING (AUB): ICD-10-CM

## 2019-08-07 DIAGNOSIS — Z01.419 WELL WOMAN EXAM WITH ROUTINE GYNECOLOGICAL EXAM: Primary | ICD-10-CM

## 2019-08-07 DIAGNOSIS — Z30.011 ENCOUNTER FOR INITIAL PRESCRIPTION OF CONTRACEPTIVE PILLS: ICD-10-CM

## 2019-08-07 DIAGNOSIS — Z11.51 ENCOUNTER FOR SCREENING FOR HUMAN PAPILLOMAVIRUS (HPV): ICD-10-CM

## 2019-08-07 PROCEDURE — 99396 PREV VISIT EST AGE 40-64: CPT | Performed by: OBSTETRICS & GYNECOLOGY

## 2019-08-07 RX ORDER — ACETAMINOPHEN AND CODEINE PHOSPHATE 120; 12 MG/5ML; MG/5ML
1 SOLUTION ORAL DAILY
Qty: 84 TABLET | Refills: 3 | OUTPATIENT
Start: 2019-08-07 | End: 2020-03-10

## 2019-08-07 NOTE — PROGRESS NOTES
Chief complaint: annual    Subjective   History of Present Illness    Kadi Gonzalez is a 43 y.o.  who presents for annual exam.  Her menses are abnormal, consistent w/ anovulation, EMB 2019 chronic endometritis but benign. S/p flagyl. Pt states that menses are a little more normal. Had considered IUD but now desires to take pills. Still smoking and dc'd chantix due to side effects, but trying to quit. Needs contraception. STD screening negative 2019 declines std screening today.  Soc Hx- , pre-    Obstetric History:  OB History      Para Term  AB Living    4 3 3   1 3    SAB TAB Ectopic Molar Multiple Live Births    1                   Menstrual History:     Patient's last menstrual period was 2019 (approximate).         Current contraception: none  Mammogram: up to date.  Colonoscopy: not indicated.  DEXA: not indicated.    Exercise: moderately active  Calcium/Vitamin D: adequate intake    The following portions of the patient's history were reviewed and updated as appropriate: allergies, current medications, past family history, past medical history, past social history, past surgical history and problem list.    Review of Systems   Constitutional: Negative for activity change, fatigue, fever and unexpected weight change.   Respiratory: Negative for chest tightness and shortness of breath.    Cardiovascular: Negative for chest pain, palpitations and leg swelling.   Gastrointestinal: Negative for abdominal distention, abdominal pain, blood in stool, constipation, diarrhea, nausea and vomiting.   Endocrine: Negative for cold intolerance, heat intolerance, polydipsia, polyphagia and polyuria.   Genitourinary: Positive for menstrual problem.   Musculoskeletal: Negative for arthralgias and back pain.   Skin: Negative for color change.   Neurological: Negative for weakness and headaches.   Hematological: Does not bruise/bleed easily.   Psychiatric/Behavioral: Negative  "for confusion.       Pertinent items are noted in HPI.     Objective   Physical Exam    /72   Ht 157.5 cm (62\")   Wt 107 kg (236 lb)   LMP 07/12/2019 (Approximate)   BMI 43.16 kg/m²     General:   alert, appears stated age and cooperative   Neck: no asymmetry, masses, or scars   Heart: regular rate and rhythm, S1, S2 normal, no murmur, click, rub or gallop   Lungs: clear to auscultation bilaterally   Abdomen: soft, non-tender, without masses or organomegaly and protuberant   Breast: inspection negative, no nipple discharge or bleeding, no masses or nodularity palpable, bilateral breast implants   Vulva: normal, Bartholin's, Urethra, [place]'s normal   Vagina: normal mucosa   Cervix: no bleeding following Pap, no cervical motion tenderness and no lesions   Uterus: normal size, mobile, non-tender, normal shape and consistency   Adnexa: normal adnexa and no mass, fullness, tenderness   Rectal: not indicated     Assessment/Plan   Kadi was seen today for gynecologic exam.    Diagnoses and all orders for this visit:    Well woman exam with routine gynecological exam  -     PapIG, HPV, Rfx 16 / 18    Encounter for screening for human papillomavirus (HPV)  -     PapIG, HPV, Rfx 16 / 18    Encounter for initial prescription of contraceptive pills  -     norethindrone (MICRONOR) 0.35 MG tablet; Take 1 tablet by mouth Daily.    Abnormal uterine bleeding (AUB)  -     norethindrone (MICRONOR) 0.35 MG tablet; Take 1 tablet by mouth Daily.    needs to use a back up form of contraception for the first month    Breast self exam technique reviewed and patient encouraged to perform self-exam monthly.  Discussed healthy lifestyle modifications.  Pap smear sent                 "

## 2019-08-08 ENCOUNTER — TELEPHONE (OUTPATIENT)
Dept: OBSTETRICS AND GYNECOLOGY | Age: 44
End: 2019-08-08

## 2019-08-08 DIAGNOSIS — R92.8 ABNORMALITY OF RIGHT BREAST ON SCREENING MAMMOGRAM: Primary | ICD-10-CM

## 2019-08-09 ENCOUNTER — APPOINTMENT (OUTPATIENT)
Dept: WOMENS IMAGING | Facility: HOSPITAL | Age: 44
End: 2019-08-09

## 2019-08-09 PROCEDURE — G0279 TOMOSYNTHESIS, MAMMO: HCPCS | Performed by: RADIOLOGY

## 2019-08-09 PROCEDURE — 77061 BREAST TOMOSYNTHESIS UNI: CPT | Performed by: RADIOLOGY

## 2019-08-09 PROCEDURE — 77065 DX MAMMO INCL CAD UNI: CPT | Performed by: RADIOLOGY

## 2019-08-10 LAB
CYTOLOGIST CVX/VAG CYTO: NORMAL
CYTOLOGY CVX/VAG DOC CYTO: NORMAL
CYTOLOGY CVX/VAG DOC THIN PREP: NORMAL
DX ICD CODE: NORMAL
HIV 1 & 2 AB SER-IMP: NORMAL
HPV I/H RISK 1 DNA CVX QL PROBE+SIG AMP: NORMAL
HPV I/H RISK 4 DNA CVX QL PROBE+SIG AMP: POSITIVE
OTHER STN SPEC: NORMAL
STAT OF ADQ CVX/VAG CYTO-IMP: NORMAL

## 2019-08-16 ENCOUNTER — TELEPHONE (OUTPATIENT)
Dept: OBSTETRICS AND GYNECOLOGY | Age: 44
End: 2019-08-16

## 2019-08-16 NOTE — TELEPHONE ENCOUNTER
----- Message from Gia Garcia MD sent at 8/14/2019 10:58 AM EDT -----  Pap normal but HPV+ for a high risk type  Recommend coming in for repeat HPV testing to run 16/18 HPV types ( these are the more aggressive strains of HPV) and if positive, we would need to do a biopsy of her cervix

## 2019-08-21 DIAGNOSIS — R92.8 ABNORMALITY OF RIGHT BREAST ON SCREENING MAMMOGRAM: Primary | ICD-10-CM

## 2019-09-03 DIAGNOSIS — F41.9 ANXIETY: ICD-10-CM

## 2019-09-04 RX ORDER — CITALOPRAM 40 MG/1
40 TABLET ORAL DAILY
Qty: 90 TABLET | Refills: 0 | Status: SHIPPED | OUTPATIENT
Start: 2019-09-04 | End: 2019-12-04 | Stop reason: SDUPTHER

## 2019-09-06 ENCOUNTER — OFFICE VISIT (OUTPATIENT)
Dept: OBSTETRICS AND GYNECOLOGY | Age: 44
End: 2019-09-06

## 2019-09-06 VITALS
BODY MASS INDEX: 43.43 KG/M2 | HEIGHT: 62 IN | WEIGHT: 236 LBS | SYSTOLIC BLOOD PRESSURE: 110 MMHG | DIASTOLIC BLOOD PRESSURE: 64 MMHG

## 2019-09-06 DIAGNOSIS — R87.810 CERVICAL HIGH RISK HPV (HUMAN PAPILLOMAVIRUS) TEST POSITIVE: Primary | ICD-10-CM

## 2019-09-06 PROCEDURE — 99213 OFFICE O/P EST LOW 20 MIN: CPT | Performed by: OBSTETRICS & GYNECOLOGY

## 2019-09-09 LAB
HPV16 DNA CVX QL PROBE+SIG AMP: NEGATIVE
HPV18+45 E6+E7 MRNA CVX QL NAA+PROBE: NEGATIVE

## 2019-09-09 NOTE — PROGRESS NOTES
"Subjective     Chief Complaint   Patient presents with   • Follow-up     repeat pap       Kadi Gonzalez is a 43 y.o.  whose LMP is Patient's last menstrual period was 2019 (exact date). presents for HPV typing. Recent pap 2019 normal but HPV+ high risk. Need to check for types 16 and 18. On progesterone only pills for contraception and cycle control (AUB).      No Additional Complaints Reported    The following portions of the patient's history were reviewed and updated as appropriate:vital signs, allergies, current medications, past family history, past medical history, past social history, past surgical history and problem list      Review of Systems   A comprehensive review of systems was negative.     Objective      /64   Ht 157.5 cm (62\")   Wt 107 kg (236 lb)   LMP 2019 (Exact Date)   Breastfeeding? No   BMI 43.16 kg/m²     Physical Exam    General:   alert, appears stated age and no distress   Heart:    Lungs:    Breast:    Neck:    Abdomen:    CVA: Not performed today   Pelvis: External genitalia: normal general appearance  Urinary system: urethral meatus normal  Vaginal: normal mucosa without prolapse or lesions  Cervix: normal appearance   Extremities: Extremities normal, atraumatic, no cyanosis or edema   Neurologic: negative   Psychiatric: Normal affect, judgement, and mood       Lab Review   Labs: pap reviewed as above     Imaging   No data reviewed    Assessment/Plan     ASSESSMENT  1. Cervical high risk HPV (human papillomavirus) test positive        PLAN  1.   Orders Placed This Encounter   Procedures   • HPV Genotypes 16 / 18,45 - ThinPrep Vial, Cervix       2. Medications prescribed this encounter:      No orders of the defined types were placed in this encounter.      3. Will call with results, if 16/18+, will need colpo, if negative, repeat pap and HPV 1 yr    Follow up: 1 year(s) and prn    Gia Garcia MD  2019           "

## 2019-09-13 ENCOUNTER — TELEPHONE (OUTPATIENT)
Dept: OBSTETRICS AND GYNECOLOGY | Age: 44
End: 2019-09-13

## 2019-09-13 NOTE — TELEPHONE ENCOUNTER
----- Message from Gia Garcia MD sent at 9/9/2019  6:42 PM EDT -----  HPV 16 and 18 are negative  Plan to repeat pap and HPV in 1 yr

## 2019-12-04 DIAGNOSIS — J45.909 UNCOMPLICATED ASTHMA: ICD-10-CM

## 2019-12-04 DIAGNOSIS — F41.9 ANXIETY: ICD-10-CM

## 2019-12-04 RX ORDER — CITALOPRAM 40 MG/1
40 TABLET ORAL DAILY
Qty: 30 TABLET | Refills: 0 | Status: SHIPPED | OUTPATIENT
Start: 2019-12-04 | End: 2020-01-13

## 2019-12-04 RX ORDER — ALBUTEROL SULFATE 90 UG/1
AEROSOL, METERED RESPIRATORY (INHALATION)
Qty: 18 G | Refills: 0 | OUTPATIENT
Start: 2019-12-04 | End: 2020-03-10

## 2020-01-11 DIAGNOSIS — G47.00 INSOMNIA, UNSPECIFIED TYPE: ICD-10-CM

## 2020-01-11 DIAGNOSIS — F41.9 ANXIETY: ICD-10-CM

## 2020-01-13 RX ORDER — CITALOPRAM 40 MG/1
40 TABLET ORAL DAILY
Qty: 30 TABLET | Refills: 0 | OUTPATIENT
Start: 2020-01-13 | End: 2020-03-10

## 2020-01-13 RX ORDER — AMITRIPTYLINE HYDROCHLORIDE 25 MG/1
25 TABLET, FILM COATED ORAL NIGHTLY
Qty: 90 TABLET | Refills: 1 | Status: SHIPPED | OUTPATIENT
Start: 2020-01-13 | End: 2020-08-15 | Stop reason: SDUPTHER

## 2020-02-25 DIAGNOSIS — F41.9 ANXIETY: ICD-10-CM

## 2020-02-26 RX ORDER — CITALOPRAM 40 MG/1
40 TABLET ORAL DAILY
Qty: 30 TABLET | Refills: 0 | OUTPATIENT
Start: 2020-02-26

## 2020-02-27 ENCOUNTER — TELEPHONE (OUTPATIENT)
Dept: OBSTETRICS AND GYNECOLOGY | Age: 45
End: 2020-02-27

## 2020-02-27 NOTE — TELEPHONE ENCOUNTER
LM to remind pt of need for 6 month follow up mammogram of right breast.  Order has been placed by Dr. Garcia and is due now.

## 2020-03-02 ENCOUNTER — APPOINTMENT (OUTPATIENT)
Dept: WOMENS IMAGING | Facility: HOSPITAL | Age: 45
End: 2020-03-02

## 2020-03-02 PROCEDURE — 77065 DX MAMMO INCL CAD UNI: CPT | Performed by: RADIOLOGY

## 2020-03-02 PROCEDURE — G0279 TOMOSYNTHESIS, MAMMO: HCPCS | Performed by: RADIOLOGY

## 2020-03-02 PROCEDURE — 77061 BREAST TOMOSYNTHESIS UNI: CPT | Performed by: RADIOLOGY

## 2020-03-04 DIAGNOSIS — R92.1 CALCIFICATION OF RIGHT BREAST ON MAMMOGRAPHY: Primary | ICD-10-CM

## 2020-04-16 ENCOUNTER — TELEMEDICINE (OUTPATIENT)
Dept: INTERNAL MEDICINE | Facility: CLINIC | Age: 45
End: 2020-04-16

## 2020-04-16 DIAGNOSIS — G47.00 INSOMNIA, UNSPECIFIED TYPE: ICD-10-CM

## 2020-04-16 DIAGNOSIS — R05.9 COUGH: Primary | ICD-10-CM

## 2020-04-16 DIAGNOSIS — F41.9 ANXIETY: ICD-10-CM

## 2020-04-16 DIAGNOSIS — J30.1 SEASONAL ALLERGIC RHINITIS DUE TO POLLEN: ICD-10-CM

## 2020-04-16 PROCEDURE — 99214 OFFICE O/P EST MOD 30 MIN: CPT | Performed by: FAMILY MEDICINE

## 2020-04-16 RX ORDER — CITALOPRAM 40 MG/1
40 TABLET ORAL DAILY
Qty: 30 TABLET | Refills: 5 | Status: SHIPPED | OUTPATIENT
Start: 2020-04-16

## 2020-04-16 RX ORDER — BENZONATATE 200 MG/1
200 CAPSULE ORAL 3 TIMES DAILY PRN
Qty: 30 CAPSULE | Refills: 0 | Status: SHIPPED | OUTPATIENT
Start: 2020-04-16 | End: 2021-05-26

## 2020-04-16 NOTE — PROGRESS NOTES
Kadi Gonzalez is a 44 y.o. female, who presents with a chief complaint of   Chief Complaint   Patient presents with   • Cough   • Anxiety   • Insomnia       HPI     Pt presents via video visit.    1. Pt complains of 1 week of a dry cough which occurs intermittently throughout the day.  Symptoms are much worse at night.  Albuterol has not helped.  She denies fever and sore throat.  She also has itchy eyes and says these symptoms typically occur in the spring and fall, although this cough is more dry than her usual.  She tried Dimetapp Cold and Cough, which didn't help.  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    2. Anxiety.  Pt reports that she is feeling well with the citalopram and is tolerating it well.  She doesn't want to try weaning off this at this time.    3. Insomnia.  Pt reports the amitriptyline has been helpful.  Her sleep has been disrupted the past few days due to the cough.    The following portions of the patient's history were reviewed and updated as appropriate: allergies, current medications, past family history, past medical history, past social history, past surgical history and problem list.    Allergies: Penicillins    Review of Systems   Constitutional: Negative for fever.   HENT: Negative for sinus pain and sore throat.    Eyes: Positive for itching.   Respiratory: Positive for cough. Negative for shortness of breath and wheezing.    Gastrointestinal: Negative.    Skin: Negative.    Allergic/Immunologic: Positive for environmental allergies.   Neurological: Negative.    Hematological: Negative.    Psychiatric/Behavioral: Positive for sleep disturbance.             Wt Readings from Last 3 Encounters:   09/06/19 107 kg (236 lb)   08/07/19 107 kg (236 lb)   05/17/19 99.3 kg (219 lb)     Temp Readings from Last 3 Encounters:   03/10/20 98.6 °F (37 °C) (Oral)   12/12/18 99 °F (37.2 °C)   09/09/18 99 °F (37.2 °C) (Temporal Artery )     BP Readings  from Last 3 Encounters:   03/10/20 115/82   09/06/19 110/64   08/07/19 128/72     Pulse Readings from Last 3 Encounters:   03/10/20 81   12/12/18 85   09/09/18 78     There is no height or weight on file to calculate BMI.  @LASTSAO2(3)@    Physical Exam   Constitutional: She is oriented to person, place, and time. She appears well-developed and well-nourished. No distress.   HENT:   Head: Normocephalic and atraumatic.   Eyes: Conjunctivae and EOM are normal.   Neck: Neck supple. No thyromegaly present.   Pulmonary/Chest: Effort normal. No respiratory distress.   Neurological: She is alert and oriented to person, place, and time.   Skin: No rash noted. No erythema.   Psychiatric: She has a normal mood and affect. Her behavior is normal.             Kadi was seen today for cough, anxiety and insomnia.    Diagnoses and all orders for this visit:    Cough  -     benzonatate (TESSALON) 200 MG capsule; Take 1 capsule by mouth 3 (Three) Times a Day As Needed for Cough.  -     HYDROcodone-homatropine (HYCODAN) 5-1.5 MG/5ML syrup; Take 5 mL by mouth Every 6 (Six) Hours As Needed for Cough.  -     QUESTIONNAIRE SERIES    Seasonal allergic rhinitis due to pollen    Anxiety  -     citalopram (CeleXA) 40 MG tablet; Take 1 tablet by mouth Daily.    Insomnia, unspecified type    1. Cough, suspect due to CEFERINO.  Add Flonase and Allegra.  Cough suppressants prescribed.  She will need to self-quarantine until 72 hours after symptoms resolve due to concerns for Covid-19.  Warning s/sxs discussed.    2. Anxiety.  Well-controlled.  Continue citalopram.  Anticipatory guidance given.    3. Insomnia.  Controlled with amitriptyline.  Continue same.      Outpatient Medications Prior to Visit   Medication Sig Dispense Refill   • albuterol (PROVENTIL) (2.5 MG/3ML) 0.083% nebulizer solution Take 2.5 mg by nebulization Every 4 (Four) Hours As Needed for Wheezing. 120 vial 0   • albuterol sulfate  (90 Base) MCG/ACT inhaler Inhale 2 puffs  Every 4 (Four) Hours As Needed for Wheezing. 1 inhaler 0   • amitriptyline (ELAVIL) 25 MG tablet TAKE 1 TABLET BY MOUTH EVERY NIGHT 90 tablet 1   • promethazine-dextromethorphan (PROMETHAZINE-DM) 6.25-15 MG/5ML syrup Take 5 mL by mouth 4 (Four) Times a Day As Needed for Cough. 180 mL 0     No facility-administered medications prior to visit.      New Medications Ordered This Visit   Medications   • benzonatate (TESSALON) 200 MG capsule     Sig: Take 1 capsule by mouth 3 (Three) Times a Day As Needed for Cough.     Dispense:  30 capsule     Refill:  0   • HYDROcodone-homatropine (HYCODAN) 5-1.5 MG/5ML syrup     Sig: Take 5 mL by mouth Every 6 (Six) Hours As Needed for Cough.     Dispense:  120 mL     Refill:  0   • citalopram (CeleXA) 40 MG tablet     Sig: Take 1 tablet by mouth Daily.     Dispense:  30 tablet     Refill:  5     [unfilled]  There are no discontinued medications.      Return in about 3 months (around 7/16/2020).

## 2020-05-20 DIAGNOSIS — J20.9 BRONCHITIS, ACUTE, WITH BRONCHOSPASM: ICD-10-CM

## 2020-05-20 DIAGNOSIS — J45.20 MILD INTERMITTENT ASTHMA WITHOUT COMPLICATION: Primary | ICD-10-CM

## 2020-05-20 RX ORDER — ALBUTEROL SULFATE 90 UG/1
2 AEROSOL, METERED RESPIRATORY (INHALATION) EVERY 4 HOURS PRN
Qty: 1 INHALER | Refills: 1 | Status: SHIPPED | OUTPATIENT
Start: 2020-05-20

## 2020-05-20 NOTE — TELEPHONE ENCOUNTER
PATIENT CALLED IN REFERENCE TO MED REFILL FOR  albuterol sulfate  (90 Base) MCG/ACT inhaler    PHARMACY WOULD NOT REFILL AND STATES THERE IS A NOTE FOR HER TO CALL HER PCP.    PATIENT CALL BACK NUMBER [phone]  PLEASE CALL AND ADVISE [phone]   Suture Removal: 7 days

## 2020-08-13 DIAGNOSIS — G47.00 INSOMNIA, UNSPECIFIED TYPE: ICD-10-CM

## 2020-08-15 RX ORDER — AMITRIPTYLINE HYDROCHLORIDE 25 MG/1
25 TABLET, FILM COATED ORAL NIGHTLY
Qty: 90 TABLET | Refills: 1 | Status: SHIPPED | OUTPATIENT
Start: 2020-08-15 | End: 2021-05-26

## 2021-05-05 ENCOUNTER — TELEPHONE (OUTPATIENT)
Dept: OBSTETRICS AND GYNECOLOGY | Age: 46
End: 2021-05-05

## 2021-05-13 ENCOUNTER — OFFICE VISIT (OUTPATIENT)
Dept: OBSTETRICS AND GYNECOLOGY | Age: 46
End: 2021-05-13

## 2021-05-13 VITALS
BODY MASS INDEX: 34.23 KG/M2 | DIASTOLIC BLOOD PRESSURE: 72 MMHG | HEIGHT: 62 IN | SYSTOLIC BLOOD PRESSURE: 126 MMHG | WEIGHT: 186 LBS

## 2021-05-13 DIAGNOSIS — Z11.3 SCREEN FOR STD (SEXUALLY TRANSMITTED DISEASE): ICD-10-CM

## 2021-05-13 DIAGNOSIS — R87.810 CERVICAL HIGH RISK HPV (HUMAN PAPILLOMAVIRUS) TEST POSITIVE: ICD-10-CM

## 2021-05-13 DIAGNOSIS — N91.2 AMENORRHEA: ICD-10-CM

## 2021-05-13 DIAGNOSIS — N93.9 ABNORMAL UTERINE BLEEDING (AUB): ICD-10-CM

## 2021-05-13 DIAGNOSIS — Z12.31 ENCOUNTER FOR SCREENING MAMMOGRAM FOR MALIGNANT NEOPLASM OF BREAST: ICD-10-CM

## 2021-05-13 DIAGNOSIS — Z01.419 WELL WOMAN EXAM WITH ROUTINE GYNECOLOGICAL EXAM: Primary | ICD-10-CM

## 2021-05-13 DIAGNOSIS — Z11.51 ENCOUNTER FOR SCREENING FOR HUMAN PAPILLOMAVIRUS (HPV): ICD-10-CM

## 2021-05-13 PROCEDURE — 99396 PREV VISIT EST AGE 40-64: CPT | Performed by: OBSTETRICS & GYNECOLOGY

## 2021-05-13 PROCEDURE — 99213 OFFICE O/P EST LOW 20 MIN: CPT | Performed by: OBSTETRICS & GYNECOLOGY

## 2021-05-13 RX ORDER — BUPROPION HYDROCHLORIDE 300 MG/1
300 TABLET ORAL DAILY
COMMUNITY
Start: 2021-04-10

## 2021-05-13 RX ORDER — FLUCONAZOLE 150 MG/1
TABLET ORAL
COMMUNITY
Start: 2021-03-03 | End: 2021-05-26

## 2021-05-13 RX ORDER — LORATADINE 10 MG/1
10 TABLET ORAL DAILY
COMMUNITY
Start: 2021-03-15

## 2021-05-13 NOTE — PROGRESS NOTES
Chief complaint: annual    Subjective   History of Present Illness    Kadi Gonzalez is a 45 y.o.  who presents for annual exam. No c/o. Last annual 2019, pap normal, but HPV+ (16/18 negative). Missed last year due to Covid. Was prescribed progesterone only pills at that visit for contraception and for tx of abnormal uterine bleeding/anovulatory cycles. Pt never took it. She reports amenorrhea for 1 yr, she thought she was menopausal but then started having menses again when she lost 60 lbs (had gained moderate amt of weight when had amneorrhea). She is not currently sexually active but desires STD screening.  Her menses are q mo again.  +tobacco use  H/o LEEP years ago  2019 pap normal HPV+  MMG needed, pt did not f/u w/ breast imaging as recommended  Colonoscopy- will schedule through primary care  Obstetric History:  OB History        4    Para   3    Term   3            AB   1    Living   3       SAB   1    TAB        Ectopic        Molar        Multiple        Live Births                   Menstrual History:     Patient's last menstrual period was 2021 (approximate).         Current contraception: abstinence  History of abnormal Pap smear: yes -    Received Gardasil immunization: no  Perform regular self breast exam: yes -    Family history of uterine or ovarian cancer: no  Family History of colon cancer: no  Family history of breast cancer: no    Mammogram: ordered.  Colonoscopy: recommended.  DEXA: not indicated.    Exercise: moderately active  Calcium/Vitamin D: adequate intake    The following portions of the patient's history were reviewed and updated as appropriate: allergies, current medications, past family history, past medical history, past social history, past surgical history and problem list.    Review of Systems   Constitutional: Negative.    Genitourinary: Positive for menstrual problem.       Pertinent items are noted in HPI.     Objective   Physical Exam    BP  "126/72   Ht 157.5 cm (62\")   Wt 84.4 kg (186 lb)   LMP 05/04/2021 (Approximate)   Breastfeeding No   BMI 34.02 kg/m²     General:   alert, appears stated age and cooperative   Neck: no asymmetry, masses, or scars   Heart: regular rate and rhythm, S1, S2 normal, no murmur, click, rub or gallop   Lungs: clear to auscultation bilaterally   Abdomen: soft, non-tender, without masses or organomegaly   Breast: inspection negative, no nipple discharge or bleeding, no masses or nodularity palpable   Vulva: normal, Bartholin's, Urethra, [place]'s normal   Vagina: normal mucosa   Cervix: no bleeding following Pap, no cervical motion tenderness and no lesions   Uterus: normal size, anteverted, mobile, non-tender, normal shape and consistency   Adnexa: normal adnexa and no mass, fullness, tenderness   Rectal: not indicated     Assessment/Plan   Diagnoses and all orders for this visit:    1. Well woman exam with routine gynecological exam (Primary)  -     IGP,CtNg,AptimaHPV,rfx16 / 18,45    2. Encounter for screening for human papillomavirus (HPV)  -     IGP,CtNg,AptimaHPV,rfx16 / 18,45    3. Screen for STD (sexually transmitted disease)  -     HIV-1 / O / 2 Ag / Antibody 4th Generation  -     Hepatitis B Surface Antigen  -     Hepatitis C Antibody  -     RPR  -     IGP,CtNg,AptimaHPV,rfx16 / 18,45    4. Amenorrhea  -     TSH  -     IGP,CtNg,AptimaHPV,rfx16 / 18,45    5. Encounter for screening mammogram for malignant neoplasm of breast  -     Mammo screening digital tomosynthesis bilateral w CAD; Future    6. Cervical high risk HPV (human papillomavirus) test positive    7. Abnormal uterine bleeding (AUB)    discussed concerned about amenorrhea for 1 yr, likley due to anovulatory cycles and excess weight/estrogen w/ overgrowth of endometrial lining. Recommend US and EMB to r/o cancer and hyperplasia. Then would recommend progesterone therapy to prevent hyperplasia and cancer.    Breast self exam technique reviewed and patient " encouraged to perform self-exam monthly.  Discussed healthy lifestyle modifications.  Pap smear sent    Recommend tobacco cessation

## 2021-05-17 LAB
C TRACH RRNA CVX QL NAA+PROBE: NEGATIVE
CYTOLOGIST CVX/VAG CYTO: NORMAL
CYTOLOGY CVX/VAG DOC CYTO: NORMAL
CYTOLOGY CVX/VAG DOC THIN PREP: NORMAL
DX ICD CODE: NORMAL
HIV 1 & 2 AB SER-IMP: NORMAL
HPV I/H RISK 4 DNA CVX QL PROBE+SIG AMP: NEGATIVE
N GONORRHOEA RRNA CVX QL NAA+PROBE: NEGATIVE
OTHER STN SPEC: NORMAL
STAT OF ADQ CVX/VAG CYTO-IMP: NORMAL

## 2021-05-18 ENCOUNTER — TELEPHONE (OUTPATIENT)
Dept: OBSTETRICS AND GYNECOLOGY | Age: 46
End: 2021-05-18

## 2021-05-21 ENCOUNTER — TELEPHONE (OUTPATIENT)
Dept: OBSTETRICS AND GYNECOLOGY | Age: 46
End: 2021-05-21

## 2021-05-26 ENCOUNTER — OFFICE VISIT (OUTPATIENT)
Dept: OBSTETRICS AND GYNECOLOGY | Age: 46
End: 2021-05-26

## 2021-05-26 VITALS
SYSTOLIC BLOOD PRESSURE: 128 MMHG | DIASTOLIC BLOOD PRESSURE: 78 MMHG | HEIGHT: 62 IN | WEIGHT: 184 LBS | BODY MASS INDEX: 33.86 KG/M2

## 2021-05-26 DIAGNOSIS — N93.9 ABNORMAL UTERINE BLEEDING (AUB): ICD-10-CM

## 2021-05-26 DIAGNOSIS — B96.89 BV (BACTERIAL VAGINOSIS): Primary | ICD-10-CM

## 2021-05-26 DIAGNOSIS — Z01.818 PREPROCEDURAL EXAMINATION: Primary | ICD-10-CM

## 2021-05-26 DIAGNOSIS — N76.0 BV (BACTERIAL VAGINOSIS): Primary | ICD-10-CM

## 2021-05-26 DIAGNOSIS — R93.89 THICKENED ENDOMETRIUM: ICD-10-CM

## 2021-05-26 PROBLEM — R87.810 CERVICAL HIGH RISK HPV (HUMAN PAPILLOMAVIRUS) TEST POSITIVE: Status: RESOLVED | Noted: 2021-05-13 | Resolved: 2021-05-26

## 2021-05-26 PROCEDURE — 81025 URINE PREGNANCY TEST: CPT | Performed by: OBSTETRICS & GYNECOLOGY

## 2021-05-26 PROCEDURE — 99213 OFFICE O/P EST LOW 20 MIN: CPT | Performed by: OBSTETRICS & GYNECOLOGY

## 2021-05-26 PROCEDURE — 58100 BIOPSY OF UTERUS LINING: CPT | Performed by: OBSTETRICS & GYNECOLOGY

## 2021-05-26 RX ORDER — ACETAMINOPHEN AND CODEINE PHOSPHATE 120; 12 MG/5ML; MG/5ML
1 SOLUTION ORAL DAILY
Qty: 28 TABLET | Refills: 12 | Status: SHIPPED | OUTPATIENT
Start: 2021-05-26 | End: 2022-05-26

## 2021-05-26 RX ORDER — METRONIDAZOLE 7.5 MG/G
GEL VAGINAL 2 TIMES DAILY
Qty: 70 G | Refills: 0 | Status: SHIPPED | OUTPATIENT
Start: 2021-05-26 | End: 2021-05-26

## 2021-05-26 NOTE — PROGRESS NOTES
"Subjective     Chief Complaint   Patient presents with   • Follow-up     Gyn US today amenorrhea and pelvic pain       Kadi Gonzalez is a 45 y.o.  whose LMP is Patient's last menstrual period was 2021 (approximate). presents for pelvic US and EMB for h/o abnormal uterine bleeding. Has a period of time w/ amenorrhea, then started having normal menses again when lost 60 lb. Not sexually active.  Recent pap normal, HPV-      No Additional Complaints Reported    The following portions of the patient's history were reviewed and updated as appropriate:vital signs, allergies, current medications, past family history, past medical history, past social history, past surgical history and problem list      Review of Systems   A comprehensive review of systems was negative except for: Genitourinary: positive for abnormal menstrual periods     Objective      /78   Ht 157.5 cm (62\")   Wt 83.5 kg (184 lb)   LMP 2021 (Approximate)   Breastfeeding No   BMI 33.65 kg/m²     Physical Exam    General:   alert, appears stated age and no distress   Heart:    Lungs:    Breast:    Neck:    Abdomen:    CVA:    Pelvis: External genitalia: normal general appearance  Urinary system: urethral meatus normal  Vaginal: normal mucosa without prolapse or lesions  Cervix: normal appearance   Extremities: Extremities normal, atraumatic, no cyanosis or edema   Neurologic: negative   Psychiatric: Normal affect, judgement, and mood       Lab Review   Labs: UPT negative  Pap normal as above     Imaging   Ultrasound - Pelvic Vaginal (images reviewed)  Uterus 8.9 x 5.9 x 4.6 cm, EML 1.1 cm, normal ovaries, no adnexal masses, no free fluid, no fibroids or polyps    Assessment/Plan     ASSESSMENT  1. Preprocedural examination    2. Abnormal uterine bleeding (AUB)    3. Thickened endometrium        PLAN  1.   Orders Placed This Encounter   Procedures   • Endometrial Biopsy   • POC Pregnancy, Urine       2. Medications prescribed " this encounter:        New Medications Ordered This Visit   Medications   • norethindrone (MICRONOR) 0.35 MG tablet     Sig: Take 1 tablet by mouth Daily.     Dispense:  28 tablet     Refill:  12       3. Procedure- informed consent obtained for endometrial biopsy. Betadine prep done. EMB done w/ pipelle using single-tooth tenaculum for retraction. Specimen sent to pathology. Tenaculum site hemostatic. Pt tolerated well. Recommend OTC ibuprofen prn cramping. Will call with results.    Recommend progesterone therapy to prevent endometrial hyperplasia or cancer. This will also provide contraception in case she becomes sexually active again.    Follow up: 1 yr annual and prn  She was notified that I am leaving the practice. She will see Dr Stevens in the future.    Gia Garcia MD  5/26/2021

## 2024-11-20 ENCOUNTER — TELEPHONE (OUTPATIENT)
Age: 49
End: 2024-11-20
Payer: COMMERCIAL

## 2024-11-20 NOTE — TELEPHONE ENCOUNTER
I spoke with patient and she gave me telephone number to Dr Colbert office to obtain labs, and recent office note. I reached out to [facility] and spoke directly with dr Velazquez who is faxing labs, and office notes to us.

## 2024-11-27 ENCOUNTER — LAB (OUTPATIENT)
Facility: HOSPITAL | Age: 49
End: 2024-11-27
Payer: COMMERCIAL

## 2024-11-27 ENCOUNTER — OFFICE VISIT (OUTPATIENT)
Age: 49
End: 2024-11-27
Payer: COMMERCIAL

## 2024-11-27 VITALS
HEIGHT: 62 IN | HEART RATE: 75 BPM | WEIGHT: 145.6 LBS | OXYGEN SATURATION: 96 % | BODY MASS INDEX: 26.79 KG/M2 | DIASTOLIC BLOOD PRESSURE: 60 MMHG | TEMPERATURE: 98.7 F | SYSTOLIC BLOOD PRESSURE: 112 MMHG

## 2024-11-27 DIAGNOSIS — R76.8 POSITIVE ANA (ANTINUCLEAR ANTIBODY): Primary | ICD-10-CM

## 2024-11-27 DIAGNOSIS — M25.50 POLYARTHRALGIA: ICD-10-CM

## 2024-11-27 DIAGNOSIS — F17.200 TOBACCO USE DISORDER: ICD-10-CM

## 2024-11-27 DIAGNOSIS — R21 RASH: ICD-10-CM

## 2024-11-27 DIAGNOSIS — M79.89 SWELLING OF EXTREMITY: ICD-10-CM

## 2024-11-27 LAB
BILIRUB UR QL STRIP: NEGATIVE
C3 SERPL-MCNC: 127 MG/DL (ref 82–167)
C4 SERPL-MCNC: 39 MG/DL (ref 14–44)
CLARITY UR: CLEAR
COLOR UR: YELLOW
CREAT UR-MCNC: 114.8 MG/DL
CRP SERPL-MCNC: 0.45 MG/DL (ref 0–0.5)
ERYTHROCYTE [SEDIMENTATION RATE] IN BLOOD: 10 MM/HR (ref 0–20)
GLUCOSE UR STRIP-MCNC: NEGATIVE MG/DL
HGB UR QL STRIP.AUTO: NEGATIVE
HOLD SPECIMEN: NORMAL
KETONES UR QL STRIP: NEGATIVE
LEUKOCYTE ESTERASE UR QL STRIP.AUTO: NEGATIVE
NITRITE UR QL STRIP: NEGATIVE
PH UR STRIP.AUTO: 6 [PH] (ref 5–8)
PROT ?TM UR-MCNC: 15.1 MG/DL
PROT UR QL STRIP: NEGATIVE
PROT/CREAT UR: 131.5 MG/G CREA (ref 0–200)
SP GR UR STRIP: 1.01 (ref 1–1.03)
UROBILINOGEN UR QL STRIP: NORMAL

## 2024-11-27 PROCEDURE — 86140 C-REACTIVE PROTEIN: CPT | Performed by: STUDENT IN AN ORGANIZED HEALTH CARE EDUCATION/TRAINING PROGRAM

## 2024-11-27 PROCEDURE — 86147 CARDIOLIPIN ANTIBODY EA IG: CPT | Performed by: STUDENT IN AN ORGANIZED HEALTH CARE EDUCATION/TRAINING PROGRAM

## 2024-11-27 PROCEDURE — 85732 THROMBOPLASTIN TIME PARTIAL: CPT | Performed by: STUDENT IN AN ORGANIZED HEALTH CARE EDUCATION/TRAINING PROGRAM

## 2024-11-27 PROCEDURE — 86200 CCP ANTIBODY: CPT | Performed by: STUDENT IN AN ORGANIZED HEALTH CARE EDUCATION/TRAINING PROGRAM

## 2024-11-27 PROCEDURE — 82570 ASSAY OF URINE CREATININE: CPT | Performed by: STUDENT IN AN ORGANIZED HEALTH CARE EDUCATION/TRAINING PROGRAM

## 2024-11-27 PROCEDURE — 85652 RBC SED RATE AUTOMATED: CPT | Performed by: STUDENT IN AN ORGANIZED HEALTH CARE EDUCATION/TRAINING PROGRAM

## 2024-11-27 PROCEDURE — 85613 RUSSELL VIPER VENOM DILUTED: CPT | Performed by: STUDENT IN AN ORGANIZED HEALTH CARE EDUCATION/TRAINING PROGRAM

## 2024-11-27 PROCEDURE — 85670 THROMBIN TIME PLASMA: CPT | Performed by: STUDENT IN AN ORGANIZED HEALTH CARE EDUCATION/TRAINING PROGRAM

## 2024-11-27 PROCEDURE — 86160 COMPLEMENT ANTIGEN: CPT | Performed by: STUDENT IN AN ORGANIZED HEALTH CARE EDUCATION/TRAINING PROGRAM

## 2024-11-27 PROCEDURE — 86431 RHEUMATOID FACTOR QUANT: CPT | Performed by: STUDENT IN AN ORGANIZED HEALTH CARE EDUCATION/TRAINING PROGRAM

## 2024-11-27 PROCEDURE — 36415 COLL VENOUS BLD VENIPUNCTURE: CPT | Performed by: STUDENT IN AN ORGANIZED HEALTH CARE EDUCATION/TRAINING PROGRAM

## 2024-11-27 PROCEDURE — 86146 BETA-2 GLYCOPROTEIN ANTIBODY: CPT | Performed by: STUDENT IN AN ORGANIZED HEALTH CARE EDUCATION/TRAINING PROGRAM

## 2024-11-27 PROCEDURE — 81003 URINALYSIS AUTO W/O SCOPE: CPT | Performed by: STUDENT IN AN ORGANIZED HEALTH CARE EDUCATION/TRAINING PROGRAM

## 2024-11-27 PROCEDURE — 84156 ASSAY OF PROTEIN URINE: CPT | Performed by: STUDENT IN AN ORGANIZED HEALTH CARE EDUCATION/TRAINING PROGRAM

## 2024-11-27 PROCEDURE — 85705 THROMBOPLASTIN INHIBITION: CPT | Performed by: STUDENT IN AN ORGANIZED HEALTH CARE EDUCATION/TRAINING PROGRAM

## 2024-11-27 RX ORDER — DEXTROAMPHETAMINE SACCHARATE, AMPHETAMINE ASPARTATE, DEXTROAMPHETAMINE SULFATE AND AMPHETAMINE SULFATE 5; 5; 5; 5 MG/1; MG/1; MG/1; MG/1
20 TABLET ORAL 2 TIMES DAILY
COMMUNITY
Start: 2024-11-10

## 2024-11-27 NOTE — PROGRESS NOTES
RHEUMATOLOGY NEW PATIENT VISIT  2024    Patient Name: Kadi Gonzalez : 1975 Medical Record: [id number]    PCP: Bebe Kuo MD    Referring provider: Bhavna Velazquez    REASON FOR CONSULTATION  Malar rash and positive ANTONINA    HISTORY OF PRESENT ILLNESS  Kadi Gonzalez is a 49 y.o. female with past medical history of polyarthralgia, chronic fatigue, anxiety, ADHD, tobacco use, asthma, who was referred for evaluation for positive ANTONINA.    History is obtained from patient and review of past records.. She is a poor historian.  Overall, she is concerned about recurrent flu-like sxs , oral sores , fatigue, joint pain and intermittent swellings she has been experiencing. She shares pictures on her phone that shows left hand swell, which she states resolved spontaneously.  She has had facial rashes (not photosensitive), easy bruising, but also states she has begun noticing red rashes in her extremity.      She does not have personal or family history of autoimmune or rheumatologic condition.     She is not having active rashes, joint pains or swellings today.  She has a history of 1 pregnancy loss at 12 wks gestation, but has 3 living kids, last one is 12 yrs old. Denies DVT or pulmonary embolism.   She has no sicca symptoms, no alopecia, no raynaud's, no muscle weakness, no serositis, no persistent headaches or eye inflammation. She denies hematuria or frothy urine.      She works as a  at an elementary school.  She smokes 20 packs/day for the past 20 years.    Pertinent labs:  5/15/24- positive ANTONINA, which was negative on repeat testing on  done with the more reliable IFA testing.  Other sub-serologies were negative.    Current Outpatient Medications:     albuterol sulfate  (90 Base) MCG/ACT inhaler, Inhale 2 puffs Every 4 (Four) Hours As Needed for Wheezing. (Patient taking differently: Inhale 2 puffs As Needed for Wheezing.), Disp: 1 inhaler, Rfl: 1     amphetamine-dextroamphetamine (ADDERALL) 20 MG tablet, Take 1 tablet by mouth 2 (Two) Times a Day., Disp: , Rfl:     citalopram (CeleXA) 40 MG tablet, Take 1 tablet by mouth Daily., Disp: 30 tablet, Rfl: 5    azithromycin (ZITHROMAX) 250 MG tablet, Take 2 tablets the first day, then 1 tablet daily for 4 days., Disp: 6 tablet, Rfl: 0    buPROPion XL (WELLBUTRIN XL) 300 MG 24 hr tablet, Take 300 mg by mouth Daily., Disp: , Rfl:     loratadine (CLARITIN) 10 MG tablet, Take 10 mg by mouth Daily., Disp: , Rfl:     norethindrone (MICRONOR) 0.35 MG tablet, Take 1 tablet by mouth Daily., Disp: 28 tablet, Rfl: 12     There are no discontinued medications.      Past Medical History:   Diagnosis Date    Abnormal Pap smear of cervix     h/o LEEP years ago    Anxiety     Asthma     Cervical dysplasia     Chronic fatigue 2017    Depression     Diarrhea due to malabsorption 2017    Gonorrhea     HPV (human papilloma virus) infection     pap normal, HPV+     Social History     Socioeconomic History    Marital status: Single   Tobacco Use    Smoking status: Every Day     Current packs/day: 1.00     Types: Cigarettes    Smokeless tobacco: Never   Vaping Use    Vaping status: Never Used   Substance and Sexual Activity    Alcohol use: Yes     Alcohol/week: 3.0 standard drinks of alcohol     Types: 3 Standard drinks or equivalent per week    Drug use: No    Sexual activity: Not Currently     Partners: Male     Birth control/protection: None     OB History          4    Para   3    Term   3            AB   1    Living   3         SAB   1    IAB        Ectopic        Molar        Multiple        Live Births                   Immunization History   Administered Date(s) Administered    COVID-19 (MODERNA) 1st,2nd,3rd Dose Monovalent 2021, 2021    COVID-19 (MODERNA) Monovalent Original Booster 12/10/2021    PPD - Urgent Care Use Only 2019      Family History   Problem Relation Age of Onset     "Hypertension Mother     Hypertension Father     Transient ischemic attack Father     Hypertension Maternal Grandmother     Diabetes Maternal Grandfather     Lymphoma Maternal Grandfather     Deep vein thrombosis Paternal Grandmother     Pulmonary embolism Paternal Grandmother     Breast cancer Neg Hx     Ovarian cancer Neg Hx     Uterine cancer Neg Hx     Colon cancer Neg Hx        Past Surgical History:   Procedure Laterality Date    AUGMENTATION MAMMAPLASTY      BREAST SURGERY      CERVICAL BIOPSY  W/ LOOP ELECTRODE EXCISION      COSMETIC SURGERY      LIPOMA EXCISION       Allergies   Allergen Reactions    Penicillins Hives     PHYSICAL EXAM  Vitals:    11/27/24 1009   BP: 112/60   BP Location: Right arm   Patient Position: Sitting   Cuff Size: Adult   Pulse: 75   Temp: 98.7 °F (37.1 °C)   TempSrc: Oral   SpO2: 96%   Weight: 66 kg (145 lb 9.6 oz)   Height: 157.5 cm (62.01\")     GENERAL: The patient appeared to be in no distress. AXOX3.  SKIN: She has mild facial skin erythema but not raised. She has lower extremity rashes, ?livedo reticularis vs racemosa.    HEENT: Head was atraumatic and normocephalic. The patient was anicteric. Pupils were equally reactive to light. Ears: There were no lesions. Nose: No lesions were noted. Throat: There was no thrush, no exudate, no erythema.  NECK: Supple. There was no JVD. No bruit was heard over the carotids.  HEART: S1 and S2, regular, no murmurs, rubs, or gallops  LUNGS: Air entry was good, clear to auscultation bilaterally. No wheezes/ rhonchi/ rales.  ABDOMEN: Obese, soft and nontender. Bowel sounds were present. No organomegaly.  EXT: No clubbing, cyanosis, edema.  NEUROLOGICAL: There was no focal deficit. Cranial nerves II through XII were intact. Motor strength 5/5.    PSYCHIATRIC: no homicidal/ suicidal ideations.    Musculoskeletal:  Gait: Ambulates without assistance, normal gait  Spine: Full ROM  Upper Extremities  Shoulder: Normal. Full ROM, no crepitus  Elbow: " Normal. Full ROM, no synovitis, no deformity  Wrist: Normal. Full ROM, no synovitis, no deformity, no ulnar deviation  Fingers: Normal. No sclerodactyly, no active raynaud's, no ulcers  MCPs: Normal. no synovitis, no deformity  PIPs: Normal. no synovitis, no deformity  DIPs: Normal. no synovitis, no deformity    Nails: Normal. No pitting, no telangiectasias.    Lower Extremities  Hip: Normal. Full ROM, no tenderness to palpation  Knee: Normal. Full ROM, no erythema/swelling/laxity/crepitus.  Ankle: Normal. Full ROM, no swelling or erythema  MTPs: Normal. No swelling or erythema    LABS AND IMAGING ll laboratory data was reviewed and relevant values are noted as below.     C-Reactive Protein (mg/dL)   Date Value   11/27/2024 0.45     Results from last 7 days   Lab Units 11/27/24  1123   COLOR UA  Yellow   CLARITY UA  Clear   PROTEIN UA  Negative   PH, URINE  6.0   GLUCOSE UA  Negative   BLOOD UA  Negative   LEUKOCYTES UA  Negative   BILIRUBIN UA  Negative   UROBILINOGEN UA  0.2 E.U./dL     ASSESSMENT AND PLAN  Kadi Gonzalez is a 49 y.o. female who is being seen for evaluation of positive ANTONINA.  She has associated intermittent joint swelling, non-specific skin and oral eruption which has been self-limiting.  This clinical evaluation is otherwise unremarkable, except for the skin findings of lower extremity rashes.   It is unclear what underlying etiology explains her recurrent manifestations.   Based on her recheck negative ANTONINA, the likelihood for lupus is low , but we will obtain additional labs to exclude an antiphospholipid syndrome, given her livedo rash and will also check her RA profile to evaluate the episodic arthritis.     We have discussed our thoughts with patient and she verbalized understanding.   She has also be counseled about cessation of ongoing tobacco use.   Will discuss results of investigations once available and give additional recommendations as needed.    Diagnoses and all orders for this  visit:    1. Positive ANTONINA (antinuclear antibody) (Primary)  -     C3 Complement  -     C4 Complement  -     Sedimentation Rate  -     C-reactive Protein  -     Urinalysis With Microscopic If Indicated (No Culture) - Urine, Clean Catch  -     Protein / Creatinine Ratio, Urine - Urine, Clean Catch  -     Beta-2 Glycoprotein Antibodies  -     Anticardiolipin Antibody, IgG / M, Qn  -     Lupus Anticoagulant  -     Rheumatoid Arthritis (RA) Profile    2. Rash  -     C3 Complement  -     C4 Complement  -     Sedimentation Rate  -     C-reactive Protein  -     Urinalysis With Microscopic If Indicated (No Culture) - Urine, Clean Catch  -     Protein / Creatinine Ratio, Urine - Urine, Clean Catch  -     Beta-2 Glycoprotein Antibodies  -     Anticardiolipin Antibody, IgG / M, Qn  -     Lupus Anticoagulant  -     Rheumatoid Arthritis (RA) Profile    3. Polyarthralgia  -     C3 Complement  -     C4 Complement  -     Sedimentation Rate  -     C-reactive Protein  -     Urinalysis With Microscopic If Indicated (No Culture) - Urine, Clean Catch  -     Protein / Creatinine Ratio, Urine - Urine, Clean Catch  -     Beta-2 Glycoprotein Antibodies  -     Anticardiolipin Antibody, IgG / M, Qn  -     Lupus Anticoagulant  -     Rheumatoid Arthritis (RA) Profile    4. Swelling of extremity  -     C3 Complement  -     C4 Complement  -     Sedimentation Rate  -     C-reactive Protein  -     Urinalysis With Microscopic If Indicated (No Culture) - Urine, Clean Catch  -     Protein / Creatinine Ratio, Urine - Urine, Clean Catch  -     Beta-2 Glycoprotein Antibodies  -     Anticardiolipin Antibody, IgG / M, Qn  -     Lupus Anticoagulant  -     Rheumatoid Arthritis (RA) Profile    5. Tobacco use disorder      I spent 60 minutes caring for Kadi on this date of service. This time includes time spent by me in the following activities:preparing for the visit, reviewing tests, obtaining and/or reviewing a separately obtained history, performing a  medically appropriate examination and/or evaluation , counseling and educating the patient/family/caregiver, ordering medications, tests, or procedures, documenting information in the medical record, and independently interpreting results and communicating that information with the patient/family/caregiver

## 2024-11-29 LAB
APTT SCREEN TO CONFIRM RATIO: 1.16 RATIO (ref 0–1.34)
B2 GLYCOPROT1 IGA SER-ACNC: <9 GPI IGA UNITS (ref 0–25)
B2 GLYCOPROT1 IGG SER-ACNC: <9 GPI IGG UNITS (ref 0–20)
B2 GLYCOPROT1 IGM SER-ACNC: <9 GPI IGM UNITS (ref 0–32)
CARDIOLIPIN IGG SER IA-ACNC: <9 GPL U/ML (ref 0–14)
CARDIOLIPIN IGM SER IA-ACNC: 9 MPL U/ML (ref 0–12)
CCP IGA+IGG SERPL IA-ACNC: 7 UNITS (ref 0–19)
CONFIRM APTT/NORMAL: 35.9 SEC (ref 0–47.6)
LA 2 SCREEN W REFLEX-IMP: NORMAL
RHEUMATOID FACT SERPL-ACNC: <10 IU/ML
SCREEN APTT: 32.3 SEC (ref 0–43.5)
SCREEN DRVVT: 34.9 SEC (ref 0–47)
THROMBIN TIME: 17.5 SEC (ref 0–23)

## 2024-11-30 NOTE — PATIENT INSTRUCTIONS
Thank you for your new patient visit with Rheumatology.  Here is what we discussed:    Your initial positive ANTONINA  was shown to be negative by a more reliable repeat testing method.      We are unclear of the cause for your recurrent oral ulcers, joint swelling, skin changes, but these are not quite suggestive of lupus.  We will check additional labs to complete the work up to evaluate for other autoimmune disease    Will contact you to discuss the results when available and we will determine on the next steps.

## 2024-12-02 ENCOUNTER — TELEPHONE (OUTPATIENT)
Age: 49
End: 2024-12-02
Payer: COMMERCIAL

## 2025-02-25 ENCOUNTER — OFFICE VISIT (OUTPATIENT)
Dept: SURGERY | Facility: CLINIC | Age: 50
End: 2025-02-25
Payer: COMMERCIAL

## 2025-02-25 VITALS
HEART RATE: 73 BPM | HEIGHT: 62 IN | WEIGHT: 146 LBS | OXYGEN SATURATION: 97 % | BODY MASS INDEX: 26.87 KG/M2 | SYSTOLIC BLOOD PRESSURE: 90 MMHG | DIASTOLIC BLOOD PRESSURE: 60 MMHG

## 2025-02-25 DIAGNOSIS — K60.30 ANAL FISTULA: Primary | ICD-10-CM

## 2025-02-25 PROCEDURE — 1160F RVW MEDS BY RX/DR IN RCRD: CPT | Performed by: SURGERY

## 2025-02-25 PROCEDURE — 99204 OFFICE O/P NEW MOD 45 MIN: CPT | Performed by: SURGERY

## 2025-02-25 PROCEDURE — 1159F MED LIST DOCD IN RCRD: CPT | Performed by: SURGERY

## 2025-02-25 RX ORDER — CYCLOBENZAPRINE HCL 10 MG
TABLET ORAL
COMMUNITY
Start: 2025-02-18

## 2025-02-25 RX ORDER — PREGABALIN 75 MG/1
CAPSULE ORAL
COMMUNITY
Start: 2024-12-27

## 2025-02-25 NOTE — PROGRESS NOTES
General Surgery  Initial Office Visit    CC: Pilonidal cyst    HPI: The patient is a pleasant 49 y.o. year-old lady who presents today for evaluation of a cystic lesion just adjacent to her anus which has been present in a constant duration for several months.  She was in good health and felt no abnormalities of the perianal skin until 1 day when she noticed some drainage on her underwear.  The drainage has occurred daily since that time with nothing seeming to make it any better or worse.  She denies any significant rectal or anal pain and does not notice any swelling of the perianal skin.  She saw her primary care provider who thought this might represent a pilonidal cyst as it was within the posterior midline of her anal skin, appearing as a small dimple in the skin as most pilonidal cyst would.  There is no evidence for further dimple extension up into the gluteal cleft.  She just had a colonoscopy for screening in December of last year at [facility] by Dr. Izaiah Cevallos, and no abnormalities were found.    Past Medical History:   Anxiety with depression  Asthma  History of gonorrhea  History of HPV  Cervical dysplasia    Past Surgical History:   Bilateral breast augmentation  Cervical biopsy with conization  Lipoma excision  Colonoscopy (December 2024 at [facility]-normal)    Medications:   Albuterol inhaler 2 puffs as needed for wheezing  Adderall 20 mg twice daily  Citalopram 40 mg daily  Flexeril as needed  Lyrica 75 mg daily    Allergies: Penicillin (hives)    Family History: Paternal grandmother with history of DVT and PE, maternal grandfather with history of lymphoma, father with history of transient ischemic attack, no family history of gastrointestinal malignancy    Social History: Single, smokes 1 pack/day cigarettes, no current alcohol use    ROS:   Constitutional: Negative for fevers or chills  HENT: Negative for hearing loss or runny nose  Eyes: Negative for vision changes or scleral  icterus  Respiratory: Negative for cough or shortness of breath  Cardiovascular: Negative for chest pain or heart palpitations  Gastrointestinal: Negative for abdominal distension, nausea, vomiting, constipation, melena, or hematochezia  Genitourinary: Negative for hematuria or dysuria  Musculoskeletal: Negative for joint swelling or gait instability  Neurologic: Negative for tremors or seizures  Psychiatric: Negative for suicidal ideations or agitation  All other systems reviewed and negative    Physical Exam:  Height: 157 cm  Weight: 66 kg  BMI: 26.63  General: No acute distress, well-nourished & well-developed  HEAD: normocephalic, atraumatic  EYES: normal conjunctiva, sclera anicteric  EARS: grossly normal hearing  NECK: supple, no thyromegaly  CARDIOVASCULAR: regular rate and rhythm  RESPIRATORY: clear to auscultation bilaterally  GASTROINTESTINAL: soft, nontender, non-distended  MUSCULOSKELETAL: normal gait and station. No gross extremity abnormalities  PSYCHIATRIC: oriented x3, normal mood and affect  RECTUM: Posterior midline external anal skin shows a small dimple in the skin with a linear subcutaneous palpable tract extending closer to the anus/rectum consistent with a likely posterior midline anal fistula, there is no evidence for pilonidal cyst within the midline gluteal cleft where the skin appears normal    IMAGING:  None    ASSESSMENT & PLAN  Ms. 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  I reviewed her recent colonoscopy report.  I would recommend she consider undergoing a rectal exam under anesthesia with possible anal fistulotomy versus seton placement at her convenience.  I discussed with her the risks of that procedure which include but are not limited to bleeding, slow wound healing, significant postoperative perianal pain as the wound heals, and possible fecal incontinence if the internal sphincter muscles are involved.  If the sphincter muscles are  clearly involved, a fistulotomy will be avoided to minimize risk of incontinence and a seton will be placed with referral to colorectal surgery to discuss LIFT procedure or mucosal advancement flap.  These possibilities were discussed with her today.  Despite the risks of surgery, she has consented to proceed.    Radha Mao MD  General, Robotic, and Endoscopic Surgery  [facility]    [address], [address]  [place] [address]  P: [phone]  F: [phone]

## 2025-02-26 ENCOUNTER — PATIENT ROUNDING (BHMG ONLY) (OUTPATIENT)
Dept: SURGERY | Facility: CLINIC | Age: 50
End: 2025-02-26
Payer: COMMERCIAL

## 2025-02-26 NOTE — PROGRESS NOTES
February 26, 2025    Hello, may I speak with Kadi Gonzalez?    My name is Christian      I am  with [facility]  [address]  [facility] [address] [phone].    Before we get started may I verify your date of birth? 1975    I am calling to officially welcome you to our practice and ask about your recent visit. Is this a good time to talk? yes    Tell me about your visit with us. What things went well?  everything went well.        We're always looking for ways to make our patients' experiences even better. Do you have recommendations on ways we may improve?  no    Overall were you satisfied with your first visit to our practice? yes       I appreciate you taking the time to speak with me today. Is there anything else I can do for you? no      Thank you, and have a great day.

## 2025-04-07 ENCOUNTER — OFFICE VISIT (OUTPATIENT)
Dept: SURGERY | Facility: CLINIC | Age: 50
End: 2025-04-07
Payer: COMMERCIAL

## 2025-04-07 VITALS
DIASTOLIC BLOOD PRESSURE: 80 MMHG | BODY MASS INDEX: 28.52 KG/M2 | OXYGEN SATURATION: 97 % | HEIGHT: 62 IN | WEIGHT: 155 LBS | HEART RATE: 98 BPM | SYSTOLIC BLOOD PRESSURE: 100 MMHG

## 2025-04-07 DIAGNOSIS — K60.2 FISSURE, ANAL: ICD-10-CM

## 2025-04-07 DIAGNOSIS — K60.30 FISTULA-IN-ANO: Primary | ICD-10-CM

## 2025-04-07 PROCEDURE — 99243 OFF/OP CNSLTJ NEW/EST LOW 30: CPT | Performed by: COLON & RECTAL SURGERY

## 2025-04-07 PROCEDURE — 1160F RVW MEDS BY RX/DR IN RCRD: CPT | Performed by: COLON & RECTAL SURGERY

## 2025-04-07 PROCEDURE — 1159F MED LIST DOCD IN RCRD: CPT | Performed by: COLON & RECTAL SURGERY

## 2025-04-07 RX ORDER — DEXTROAMPHETAMINE SACCHARATE, AMPHETAMINE ASPARTATE, DEXTROAMPHETAMINE SULFATE AND AMPHETAMINE SULFATE 7.5; 7.5; 7.5; 7.5 MG/1; MG/1; MG/1; MG/1
30 TABLET ORAL 2 TIMES DAILY
COMMUNITY
Start: 2025-03-20

## 2025-04-07 NOTE — PROGRESS NOTES
Kadi Gonzalez is a 49 y.o. female who is seen as a consult at the request of Bhavna Velazquez MD for FISTULA.      HPI:  History of Present Illness  The patient is a 49-year-old female who presents for evaluation of a cystic lesion near the anus. She was seen by Dr. Mao and was diagnosed with a fistula. She is scheduled for an exam under anesthesia on 04/25/2025 for a possible fistulotomy versus seton placement.    She reports the development of a fistula, which began causing discomfort shortly before [place]. Initially presenting as a lump, it subsequently ruptured and has been draining since. The fistula is located above her anus, towards the coccyx, and is cushioned by sufficient padding, thus causing minimal discomfort. She has sought consultation from Dr. Merritt regarding this issue and has a surgical procedure scheduled. Dr. Merritt informed her that if the fistula extends deep into the sphincter muscle, she would close the wound and refer her to our care. She expresses concern about the potential need for anesthesia and the possibility of another tear. Additionally, she questions the necessity of taking stool softeners during the recovery period.       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 She started smoking about 32 years ago. She has a 32.3 pack-year smoking history. She has never been exposed to tobacco smoke. She has never used smokeless tobacco. She reports that she does not currently use alcohol after a past usage of about 3.0 standard drinks of alcohol per week. She reports that she does not use drugs.      Marriage status: Single    Family History   Problem Relation Age of Onset    Hypertension Mother     Hypertension Father     Transient ischemic attack Father     Hypertension Maternal Grandmother     Diabetes Maternal Grandfather     Lymphoma Maternal Grandfather     Deep vein thrombosis Paternal Grandmother     Pulmonary embolism Paternal Grandmother     Breast cancer Neg Hx     Ovarian cancer Neg Hx     Uterine cancer Neg Hx     Colon cancer Neg Hx          Current Outpatient Medications:     albuterol sulfate  (90 Base) MCG/ACT inhaler, Inhale 2 puffs Every 4 (Four) Hours As Needed for Wheezing. (Patient taking differently: Inhale 2 puffs As Needed for Wheezing.), Disp: 1 inhaler, Rfl: 1    amphetamine-dextroamphetamine (ADDERALL) 30 MG tablet, Take 1 tablet by mouth 2 (Two) Times a Day., Disp: , Rfl:     citalopram (CeleXA) 40 MG tablet, Take 1 tablet by mouth Daily., Disp: 30 tablet, Rfl: 5    cyclobenzaprine (FLEXERIL) 10 MG tablet, , Disp: , Rfl:     pregabalin (LYRICA) 75 MG capsule, , Disp: , Rfl:     Allergy  Penicillins      Vitals:    04/07/25 1014   BP: 100/80   Pulse: 98   SpO2: 97%     Body mass index is 28.35 kg/m².      Physical Exam    Physical Exam  Constitutional:       General: She is not in acute distress.     Appearance: She is well-developed.   HENT:      Head: Normocephalic and atraumatic.   Abdominal:      General: There is no distension.      Palpations: Abdomen is soft.   Genitourinary:     Comments: On perineal exam, there is a fissure posteriorly that has a draining perirectal abscess associated with it. No external opening at this time, but  it appears to have drained in the past and seems fairly superficial.  Musculoskeletal:         General: Normal range of motion.   Neurological:      Mental Status: She is alert.   Psychiatric:         Thought Content: Thought content normal.         Review of Medical Record:  I reviewed medical records as detailed in HPI.     Assessment & Plan  1. Fistula-in-ano    2. Fissure, anal      She reports the development of a fistula shortly before [place], which started as a lump, popped, and began draining. On examination, there is a draining perirectal abscess associated with a fissure, appearing fairly superficial. There is no external opening at this time. It was explained that fissures are typically caused by hard stools or diarrhea, and inflammatory bowel disease has been ruled out due to a recent colonoscopy. The importance of maintaining regular and soft bowel movements was emphasized, recommending daily fiber supplements such as Metamucil or Citrucel. The potential for Dr. Merritt to perform a fistulotomy was discussed. If the fistula involves more muscle than anticipated, a seton placement may be necessary, followed by a discussion on LIFT procedure.           Patient or patient representative verbalized consent for the use of Ambient Listening during the visit with  Tamara Ba MD for chart documentation. 4/14/2025  08:23 EDT

## 2025-04-18 ENCOUNTER — PRE-ADMISSION TESTING (OUTPATIENT)
Dept: PREADMISSION TESTING | Facility: HOSPITAL | Age: 50
End: 2025-04-18
Payer: COMMERCIAL

## 2025-04-18 VITALS
DIASTOLIC BLOOD PRESSURE: 74 MMHG | HEIGHT: 63 IN | WEIGHT: 146.5 LBS | RESPIRATION RATE: 16 BRPM | BODY MASS INDEX: 25.96 KG/M2 | OXYGEN SATURATION: 100 % | HEART RATE: 88 BPM | SYSTOLIC BLOOD PRESSURE: 119 MMHG | TEMPERATURE: 97.5 F

## 2025-04-18 LAB
ANION GAP SERPL CALCULATED.3IONS-SCNC: 11 MMOL/L (ref 5–15)
BUN SERPL-MCNC: 14 MG/DL (ref 6–20)
BUN/CREAT SERPL: 19.4 (ref 7–25)
CALCIUM SPEC-SCNC: 9.2 MG/DL (ref 8.6–10.5)
CHLORIDE SERPL-SCNC: 106 MMOL/L (ref 98–107)
CO2 SERPL-SCNC: 26 MMOL/L (ref 22–29)
CREAT SERPL-MCNC: 0.72 MG/DL (ref 0.57–1)
DEPRECATED RDW RBC AUTO: 41.4 FL (ref 37–54)
EGFRCR SERPLBLD CKD-EPI 2021: 102.6 ML/MIN/1.73
ERYTHROCYTE [DISTWIDTH] IN BLOOD BY AUTOMATED COUNT: 13 % (ref 12.3–15.4)
GLUCOSE SERPL-MCNC: 96 MG/DL (ref 65–99)
HCT VFR BLD AUTO: 40.3 % (ref 34–46.6)
HGB BLD-MCNC: 13.4 G/DL (ref 12–15.9)
MCH RBC QN AUTO: 29.1 PG (ref 26.6–33)
MCHC RBC AUTO-ENTMCNC: 33.3 G/DL (ref 31.5–35.7)
MCV RBC AUTO: 87.4 FL (ref 79–97)
PLATELET # BLD AUTO: 238 10*3/MM3 (ref 140–450)
PMV BLD AUTO: 9.4 FL (ref 6–12)
POTASSIUM SERPL-SCNC: 4.5 MMOL/L (ref 3.5–5.2)
RBC # BLD AUTO: 4.61 10*6/MM3 (ref 3.77–5.28)
SODIUM SERPL-SCNC: 143 MMOL/L (ref 136–145)
WBC NRBC COR # BLD AUTO: 4.2 10*3/MM3 (ref 3.4–10.8)

## 2025-04-18 PROCEDURE — 36415 COLL VENOUS BLD VENIPUNCTURE: CPT

## 2025-04-18 PROCEDURE — 85027 COMPLETE CBC AUTOMATED: CPT

## 2025-04-18 PROCEDURE — 80048 BASIC METABOLIC PNL TOTAL CA: CPT

## 2025-04-18 NOTE — DISCHARGE INSTRUCTIONS
Take the following medications the morning of surgery:  Bring rescue inhaler with you    If you are on prescription narcotic pain medication to control your pain you may also take that medication the morning of surgery.      General Instructions:     Do not eat solid food after midnight the night before surgery.  Clear liquids day of surgery are allowed but must be stopped at least two hours before your hospital arrival time.       Allowed clear liquids      Water, sodas, and tea or coffee with no cream or milk added.       12 to 20 ounces of a clear liquid that contains carbohydrates is recommended.  If non-diabetic, have Gatorade or Powerade.  If diabetic, have G2 or Powerade Zero.     Do not have liquids red in color.  Do not consume chicken, beef, pork or vegetable broth or bouillon cubes of any variety as they are not considered clear liquids and are not allowed.      Infants may have breast milk up to four hours before surgery.  Infants drinking formula may drink formula up to six hours before surgery.   Patients who avoid smoking, chewing tobacco and alcohol for 4 weeks prior to surgery have a reduced risk of post-operative complications.  Quit smoking as many days before surgery as you can.  Do not smoke, use chewing tobacco or drink alcohol the day of surgery.   If applicable bring your C-PAP/ BI-PAP machine in with you to preop day of surgery.  Bring any papers given to you in the doctor’s office.  Wear clean comfortable clothes.  Do not wear contact lenses, false eyelashes or make-up.  Bring a case for your glasses.   Bring crutches or walker if applicable.  Remove all piercings.  Leave jewelry and any other valuables at home.  Hair extensions with metal clips must be removed prior to surgery.  The Pre-Admission Testing nurse will instruct you to bring medications if unable to obtain an accurate list in Pre-Admission Testing.    Day of surgery you will need to let the preoperative nurse know the last time  you took each of your medications.  To ensure a safe environment for patients and staff, we kindly ask that children under the age of 16 not accompany patients.  If you must bring a dependent child or dependent adult please ensure a responsible adult, other than yourself, is present to supervise them.      If you were given a blood bank ID arm band remember to bring it with you the day of surgery.    Preventing a Surgical Site Infection:  For 2 to 3 days before surgery, avoid shaving with a razor because the razor can irritate skin and make it easier to develop an infection.    Any areas of open skin can increase the risk of a post-operative wound infection by allowing bacteria to enter and travel throughout the body.  Notify your surgeon if you have any skin wounds / rashes even if it is not near the expected surgical site.  The area will need assessed to determine if surgery should be delayed until it is healed.  The night prior to surgery shower using a fresh bar of anti-bacterial soap (such as Dial) and clean washcloth.  Sleep in a clean bed with clean clothing.  Do not allow pets to sleep with you.  Shower on the morning of surgery using a fresh bar of anti-bacterial soap (such as Dial) and clean washcloth.  Dry with a clean towel and dress in clean clothing.  Ask your surgeon if you will be receiving antibiotics prior to surgery.  Make sure you, your family, and all healthcare providers clean their hands with soap and water or an alcohol based hand  before caring for you or your wound.    Day of surgery:  Your arrival time is approximately two hours before your scheduled surgery time.  Please note if you have an early arrival time the surgery doors do not open before 5:00 AM.  Upon arrival, a Pre-op nurse and Anesthesiologist will review your health history, obtain vital signs, and answer questions you may have.  The only belongings needed at this time will be a list of your home medications and if  applicable your C-PAP/BI-PAP machine.  A Pre-op nurse will start an IV and you may receive medication in preparation for surgery, including something to help you relax.     Please be aware that surgery does come with discomfort.  We want to make every effort to control your discomfort so please discuss any uncontrolled symptoms with your nurse.   Your doctor will most likely have prescribed pain medications.      If you are going home after surgery you will receive individualized written care instructions before being discharged.  A responsible adult must drive you to and from the hospital on the day of your surgery and ideally stay with you through the night.   .  Discharge prescriptions can be filled by the hospital pharmacy during regular pharmacy hours.  If you are having surgery late in the day/evening your prescription may be e-prescribed to your pharmacy.  Please verify your pharmacy hours or chose a 24 hour pharmacy to avoid not having access to your prescription because your pharmacy has closed for the day.    If you are staying overnight following surgery, you will be transported to your hospital room following the recovery period.  [facility] has all private rooms.    If you have any questions please call Pre-Admission Testing at [phone].  Deductibles and co-payments are collected on the day of service. Please be prepared to pay the required co-pay, deductible or deposit on the day of service as defined by your plan.    Call your surgeon immediately if you experience any of the following symptoms:  Sore Throat  Shortness of Breath or difficulty breathing  Cough  Chills  Body soreness or muscle pain  Headache  Fever  New loss of taste or smell  Do not arrive for your surgery ill.  Your procedure will need to be rescheduled to another time.  You will need to call your physician before the day of surgery to avoid any unnecessary exposure to hospital staff as well as other patients.         CHLORHEXIDINE CLOTH INSTRUCTIONS  The morning of surgery follow these instructions using the Chlorhexidine cloths you've been given.  These steps reduce bacteria on the body.  Do not use the cloths near your eyes, ears mouth, genitalia or on open wounds.  Throw the cloths away after use but do not try to flush them down a toilet.      Open and remove one cloth at a time from the package.    Leave the cloth unfolded and begin the bathing.  Massage the skin with the cloths using gentle pressure to remove bacteria.  Do not scrub harshly.   Follow the steps below with one 2% CHG cloth per area (6 total cloths).  One cloth for neck, shoulders and chest.  One cloth for both arms, hands, fingers and underarms (do underarms last).  One cloth for the abdomen followed by groin.  One cloth for right leg and foot including between the toes.  One cloth for left leg and foot including between the toes.  The last cloth is to be used for the back of the neck, back and buttocks.    Allow the CHG to air dry 3 minutes on the skin which will give it time to work and decrease the chance of irritation.  The skin may feel sticky until it is dry.  Do not rinse with water or any other liquid or you will lose the beneficial effects of the CHG.  If mild skin irritation occurs, do rinse the skin to remove the CHG.  Report this to the nurse at time of admission.  Do not apply lotions, creams, ointments, deodorants or perfumes after using the clothes. Dress in clean clothes before coming to the hospital.

## 2025-04-25 ENCOUNTER — ANESTHESIA EVENT (OUTPATIENT)
Dept: PERIOP | Facility: HOSPITAL | Age: 50
End: 2025-04-25
Payer: COMMERCIAL

## 2025-04-25 ENCOUNTER — ANESTHESIA (OUTPATIENT)
Dept: PERIOP | Facility: HOSPITAL | Age: 50
End: 2025-04-25
Payer: COMMERCIAL

## 2025-04-25 ENCOUNTER — HOSPITAL ENCOUNTER (OUTPATIENT)
Facility: HOSPITAL | Age: 50
Setting detail: HOSPITAL OUTPATIENT SURGERY
Discharge: HOME | End: 2025-04-25
Attending: SURGERY | Admitting: SURGERY
Payer: COMMERCIAL

## 2025-04-25 VITALS
RESPIRATION RATE: 16 BRPM | OXYGEN SATURATION: 100 % | DIASTOLIC BLOOD PRESSURE: 70 MMHG | TEMPERATURE: 98.2 F | HEART RATE: 67 BPM | SYSTOLIC BLOOD PRESSURE: 100 MMHG

## 2025-04-25 DIAGNOSIS — K60.30 ANAL FISTULA: Primary | ICD-10-CM

## 2025-04-25 PROCEDURE — 25010000002 CEFAZOLIN PER 500 MG: Performed by: SURGERY

## 2025-04-25 PROCEDURE — 46270 REMOVE ANAL FIST SUBQ: CPT | Performed by: SURGERY

## 2025-04-25 PROCEDURE — 25010000002 LIDOCAINE 2% SOLUTION

## 2025-04-25 PROCEDURE — 25010000002 MIDAZOLAM PER 1 MG: Performed by: STUDENT IN AN ORGANIZED HEALTH CARE EDUCATION/TRAINING PROGRAM

## 2025-04-25 PROCEDURE — 25010000002 FENTANYL CITRATE (PF) 50 MCG/ML SOLUTION

## 2025-04-25 PROCEDURE — 25810000003 LACTATED RINGERS PER 1000 ML: Performed by: STUDENT IN AN ORGANIZED HEALTH CARE EDUCATION/TRAINING PROGRAM

## 2025-04-25 PROCEDURE — 25010000002 DROPERIDOL PER 5 MG

## 2025-04-25 PROCEDURE — 25010000002 ONDANSETRON PER 1 MG

## 2025-04-25 PROCEDURE — 25010000002 PROPOFOL 10 MG/ML EMULSION

## 2025-04-25 PROCEDURE — 25010000002 KETOROLAC TROMETHAMINE PER 15 MG

## 2025-04-25 PROCEDURE — 25010000002 DEXAMETHASONE PER 1 MG

## 2025-04-25 PROCEDURE — S0260 H&P FOR SURGERY: HCPCS | Performed by: SURGERY

## 2025-04-25 PROCEDURE — 25010000002 SUGAMMADEX 200 MG/2ML SOLUTION

## 2025-04-25 RX ORDER — FENTANYL CITRATE 50 UG/ML
50 INJECTION, SOLUTION INTRAMUSCULAR; INTRAVENOUS
Status: DISCONTINUED | OUTPATIENT
Start: 2025-04-25 | End: 2025-04-25 | Stop reason: HOSPADM

## 2025-04-25 RX ORDER — NALOXONE HCL 0.4 MG/ML
0.2 VIAL (ML) INJECTION AS NEEDED
Status: DISCONTINUED | OUTPATIENT
Start: 2025-04-25 | End: 2025-04-25 | Stop reason: HOSPADM

## 2025-04-25 RX ORDER — ATROPINE SULFATE 0.4 MG/ML
0.4 INJECTION, SOLUTION INTRAMUSCULAR; INTRAVENOUS; SUBCUTANEOUS ONCE AS NEEDED
Status: DISCONTINUED | OUTPATIENT
Start: 2025-04-25 | End: 2025-04-25 | Stop reason: HOSPADM

## 2025-04-25 RX ORDER — SODIUM CHLORIDE, SODIUM LACTATE, POTASSIUM CHLORIDE, CALCIUM CHLORIDE 600; 310; 30; 20 MG/100ML; MG/100ML; MG/100ML; MG/100ML
9 INJECTION, SOLUTION INTRAVENOUS CONTINUOUS PRN
Status: DISCONTINUED | OUTPATIENT
Start: 2025-04-25 | End: 2025-04-25 | Stop reason: HOSPADM

## 2025-04-25 RX ORDER — LABETALOL HYDROCHLORIDE 5 MG/ML
5 INJECTION, SOLUTION INTRAVENOUS
Status: DISCONTINUED | OUTPATIENT
Start: 2025-04-25 | End: 2025-04-25 | Stop reason: HOSPADM

## 2025-04-25 RX ORDER — FLUMAZENIL 0.1 MG/ML
0.2 INJECTION INTRAVENOUS AS NEEDED
Status: DISCONTINUED | OUTPATIENT
Start: 2025-04-25 | End: 2025-04-25 | Stop reason: HOSPADM

## 2025-04-25 RX ORDER — LIDOCAINE HYDROCHLORIDE 20 MG/ML
INJECTION, SOLUTION INFILTRATION; PERINEURAL AS NEEDED
Status: DISCONTINUED | OUTPATIENT
Start: 2025-04-25 | End: 2025-04-25 | Stop reason: SURG

## 2025-04-25 RX ORDER — KETOROLAC TROMETHAMINE 30 MG/ML
INJECTION, SOLUTION INTRAMUSCULAR; INTRAVENOUS AS NEEDED
Status: DISCONTINUED | OUTPATIENT
Start: 2025-04-25 | End: 2025-04-25 | Stop reason: SURG

## 2025-04-25 RX ORDER — SODIUM CHLORIDE 0.9 % (FLUSH) 0.9 %
10 SYRINGE (ML) INJECTION AS NEEDED
Status: DISCONTINUED | OUTPATIENT
Start: 2025-04-25 | End: 2025-04-25 | Stop reason: HOSPADM

## 2025-04-25 RX ORDER — BUPIVACAINE HYDROCHLORIDE AND EPINEPHRINE 5; 5 MG/ML; UG/ML
INJECTION, SOLUTION EPIDURAL; INTRACAUDAL; PERINEURAL AS NEEDED
Status: DISCONTINUED | OUTPATIENT
Start: 2025-04-25 | End: 2025-04-25 | Stop reason: HOSPADM

## 2025-04-25 RX ORDER — MAGNESIUM HYDROXIDE 1200 MG/15ML
LIQUID ORAL AS NEEDED
Status: DISCONTINUED | OUTPATIENT
Start: 2025-04-25 | End: 2025-04-25 | Stop reason: HOSPADM

## 2025-04-25 RX ORDER — EPHEDRINE SULFATE 50 MG/ML
5 INJECTION, SOLUTION INTRAVENOUS ONCE AS NEEDED
Status: DISCONTINUED | OUTPATIENT
Start: 2025-04-25 | End: 2025-04-25 | Stop reason: HOSPADM

## 2025-04-25 RX ORDER — PROMETHAZINE HYDROCHLORIDE 25 MG/1
25 TABLET ORAL ONCE AS NEEDED
Status: DISCONTINUED | OUTPATIENT
Start: 2025-04-25 | End: 2025-04-25 | Stop reason: HOSPADM

## 2025-04-25 RX ORDER — SODIUM CHLORIDE 9 MG/ML
40 INJECTION, SOLUTION INTRAVENOUS AS NEEDED
Status: DISCONTINUED | OUTPATIENT
Start: 2025-04-25 | End: 2025-04-25 | Stop reason: HOSPADM

## 2025-04-25 RX ORDER — PROPOFOL 10 MG/ML
VIAL (ML) INTRAVENOUS AS NEEDED
Status: DISCONTINUED | OUTPATIENT
Start: 2025-04-25 | End: 2025-04-25 | Stop reason: SURG

## 2025-04-25 RX ORDER — FENTANYL CITRATE 50 UG/ML
25 INJECTION, SOLUTION INTRAMUSCULAR; INTRAVENOUS
Status: DISCONTINUED | OUTPATIENT
Start: 2025-04-25 | End: 2025-04-25 | Stop reason: HOSPADM

## 2025-04-25 RX ORDER — ONDANSETRON 2 MG/ML
4 INJECTION INTRAMUSCULAR; INTRAVENOUS ONCE AS NEEDED
Status: COMPLETED | OUTPATIENT
Start: 2025-04-25 | End: 2025-04-25

## 2025-04-25 RX ORDER — ONDANSETRON 2 MG/ML
INJECTION INTRAMUSCULAR; INTRAVENOUS AS NEEDED
Status: DISCONTINUED | OUTPATIENT
Start: 2025-04-25 | End: 2025-04-25 | Stop reason: SURG

## 2025-04-25 RX ORDER — OXYCODONE AND ACETAMINOPHEN 5; 325 MG/1; MG/1
1 TABLET ORAL EVERY 4 HOURS PRN
Qty: 20 TABLET | Refills: 0 | Status: SHIPPED | OUTPATIENT
Start: 2025-04-25

## 2025-04-25 RX ORDER — FENTANYL CITRATE 50 UG/ML
INJECTION, SOLUTION INTRAMUSCULAR; INTRAVENOUS AS NEEDED
Status: DISCONTINUED | OUTPATIENT
Start: 2025-04-25 | End: 2025-04-25 | Stop reason: SURG

## 2025-04-25 RX ORDER — HYDROMORPHONE HYDROCHLORIDE 1 MG/ML
0.5 INJECTION, SOLUTION INTRAMUSCULAR; INTRAVENOUS; SUBCUTANEOUS
Status: DISCONTINUED | OUTPATIENT
Start: 2025-04-25 | End: 2025-04-25 | Stop reason: HOSPADM

## 2025-04-25 RX ORDER — HYDRALAZINE HYDROCHLORIDE 20 MG/ML
5 INJECTION INTRAMUSCULAR; INTRAVENOUS
Status: DISCONTINUED | OUTPATIENT
Start: 2025-04-25 | End: 2025-04-25 | Stop reason: HOSPADM

## 2025-04-25 RX ORDER — DEXAMETHASONE SODIUM PHOSPHATE 4 MG/ML
INJECTION, SOLUTION INTRA-ARTICULAR; INTRALESIONAL; INTRAMUSCULAR; INTRAVENOUS; SOFT TISSUE AS NEEDED
Status: DISCONTINUED | OUTPATIENT
Start: 2025-04-25 | End: 2025-04-25 | Stop reason: SURG

## 2025-04-25 RX ORDER — DROPERIDOL 2.5 MG/ML
0.62 INJECTION, SOLUTION INTRAMUSCULAR; INTRAVENOUS
Status: DISCONTINUED | OUTPATIENT
Start: 2025-04-25 | End: 2025-04-25 | Stop reason: HOSPADM

## 2025-04-25 RX ORDER — OXYCODONE AND ACETAMINOPHEN 7.5; 325 MG/1; MG/1
1 TABLET ORAL EVERY 4 HOURS PRN
Status: DISCONTINUED | OUTPATIENT
Start: 2025-04-25 | End: 2025-04-25 | Stop reason: HOSPADM

## 2025-04-25 RX ORDER — HYDROCODONE BITARTRATE AND ACETAMINOPHEN 5; 325 MG/1; MG/1
1 TABLET ORAL ONCE AS NEEDED
Status: DISCONTINUED | OUTPATIENT
Start: 2025-04-25 | End: 2025-04-25 | Stop reason: HOSPADM

## 2025-04-25 RX ORDER — ROCURONIUM BROMIDE 10 MG/ML
INJECTION, SOLUTION INTRAVENOUS AS NEEDED
Status: DISCONTINUED | OUTPATIENT
Start: 2025-04-25 | End: 2025-04-25 | Stop reason: SURG

## 2025-04-25 RX ORDER — PROMETHAZINE HYDROCHLORIDE 25 MG/1
25 SUPPOSITORY RECTAL ONCE AS NEEDED
Status: DISCONTINUED | OUTPATIENT
Start: 2025-04-25 | End: 2025-04-25 | Stop reason: HOSPADM

## 2025-04-25 RX ORDER — IPRATROPIUM BROMIDE AND ALBUTEROL SULFATE 2.5; .5 MG/3ML; MG/3ML
3 SOLUTION RESPIRATORY (INHALATION) ONCE AS NEEDED
Status: DISCONTINUED | OUTPATIENT
Start: 2025-04-25 | End: 2025-04-25 | Stop reason: HOSPADM

## 2025-04-25 RX ORDER — MIDAZOLAM HYDROCHLORIDE 1 MG/ML
1 INJECTION, SOLUTION INTRAMUSCULAR; INTRAVENOUS
Status: DISCONTINUED | OUTPATIENT
Start: 2025-04-25 | End: 2025-04-25 | Stop reason: HOSPADM

## 2025-04-25 RX ORDER — DIPHENHYDRAMINE HYDROCHLORIDE 50 MG/ML
12.5 INJECTION, SOLUTION INTRAMUSCULAR; INTRAVENOUS
Status: DISCONTINUED | OUTPATIENT
Start: 2025-04-25 | End: 2025-04-25 | Stop reason: HOSPADM

## 2025-04-25 RX ORDER — SODIUM CHLORIDE 0.9 % (FLUSH) 0.9 %
10 SYRINGE (ML) INJECTION EVERY 12 HOURS SCHEDULED
Status: DISCONTINUED | OUTPATIENT
Start: 2025-04-25 | End: 2025-04-25 | Stop reason: HOSPADM

## 2025-04-25 RX ORDER — ACETAMINOPHEN 160 MG
TABLET,DISINTEGRATING ORAL AS NEEDED
Status: DISCONTINUED | OUTPATIENT
Start: 2025-04-25 | End: 2025-04-25 | Stop reason: HOSPADM

## 2025-04-25 RX ADMIN — SODIUM CHLORIDE, POTASSIUM CHLORIDE, SODIUM LACTATE AND CALCIUM CHLORIDE 9 ML/HR: 600; 310; 30; 20 INJECTION, SOLUTION INTRAVENOUS at 12:02

## 2025-04-25 RX ADMIN — PROPOFOL 200 MG: 10 INJECTION, EMULSION INTRAVENOUS at 12:24

## 2025-04-25 RX ADMIN — LIDOCAINE HYDROCHLORIDE 70 MG: 20 INJECTION, SOLUTION INFILTRATION; PERINEURAL at 12:24

## 2025-04-25 RX ADMIN — OXYCODONE AND ACETAMINOPHEN 1 TABLET: 7.5; 325 TABLET ORAL at 13:39

## 2025-04-25 RX ADMIN — ONDANSETRON 4 MG: 2 INJECTION, SOLUTION INTRAMUSCULAR; INTRAVENOUS at 13:28

## 2025-04-25 RX ADMIN — DEXAMETHASONE SODIUM PHOSPHATE 4 MG: 4 INJECTION, SOLUTION INTRA-ARTICULAR; INTRALESIONAL; INTRAMUSCULAR; INTRAVENOUS; SOFT TISSUE at 12:30

## 2025-04-25 RX ADMIN — MIDAZOLAM 1 MG: 1 INJECTION INTRAMUSCULAR; INTRAVENOUS at 12:04

## 2025-04-25 RX ADMIN — ROCURONIUM BROMIDE 70 MG: 10 INJECTION INTRAVENOUS at 12:25

## 2025-04-25 RX ADMIN — KETOROLAC TROMETHAMINE 30 MG: 30 INJECTION, SOLUTION INTRAMUSCULAR at 12:48

## 2025-04-25 RX ADMIN — FENTANYL CITRATE 50 MCG: 50 INJECTION, SOLUTION INTRAMUSCULAR; INTRAVENOUS at 12:19

## 2025-04-25 RX ADMIN — SUGAMMADEX 400 MG: 100 INJECTION, SOLUTION INTRAVENOUS at 12:48

## 2025-04-25 RX ADMIN — CEFAZOLIN 2000 MG: 2 INJECTION, POWDER, FOR SOLUTION INTRAMUSCULAR; INTRAVENOUS at 12:07

## 2025-04-25 RX ADMIN — FENTANYL CITRATE 50 MCG: 50 INJECTION, SOLUTION INTRAMUSCULAR; INTRAVENOUS at 12:38

## 2025-04-25 RX ADMIN — DROPERIDOL 0.62 MG: 2.5 INJECTION, SOLUTION INTRAMUSCULAR; INTRAVENOUS at 13:37

## 2025-04-25 RX ADMIN — ONDANSETRON 4 MG: 2 INJECTION, SOLUTION INTRAMUSCULAR; INTRAVENOUS at 12:48

## 2025-04-25 NOTE — OP NOTE
Operative Note :  Radha Mao MD      Kadi Cuadraers  1975    Procedure Date: 04/25/25    Pre-op Diagnosis:  Anal fistula [K60.30]    Post-Operative Diagnosis:  Post-Op Diagnosis Codes:     * Anal fistula [K60.30]    Procedure:   Rectal exam under anesthesia with anal fistulotomy    Surgeon: Radha Mao MD    Assistant: None    Anesthesia:  General (general endotracheal tube)    Estimated Blood Loss: minimal    Specimens: None    Complications: None    Indications:  The patient is a 49-year-old lady who was referred to see me with what was initially deemed a pilonidal cyst, with a few draining skin dimples within the lower aspect of her gluteal cleft in the posterior midline anal skin.  On exam, I was confident this represented an anal fistula as opposed to a pilonidal cyst and recommended proceeding with rectal exam under anesthesia with anal fistulotomy.  She also met with Dr. Tamara Ba of colorectal surgery who agreed with that plan.  She was counseled on the risks of the procedure and has consented to proceed.    Findings: Posterior midline fascial anal fistula    Description of procedure:  The patient was brought to the operating room and intubated on her stretcher in supine position.  After general anesthesia was induced, she was turned prone on the OR table and the perianal skin prepped and draped in a sterile fashion.  A surgical timeout was completed.  The external anal skin was inspected and a dimple was encountered within the posterior midline skin beneath the gluteal cleft.  I was able to slightly widen the opening to the dimple and inserted an angiocatheter.  After insertion of a Laboy bivalve retractor into the anus, the posterior anal and rectal sidewall was inspected with injection of hydrogen peroxide through the angiocatheter externally.  Eventually, we were able to see the some bubbles of hydrogen peroxide emanating from a superficial opening of an anal fistula adjacent to the  dentate line within the posterior midline of the rectum/anus where the mucosa was inflamed chronically.  A lacrimal duct probe was inserted through the external opening and threaded to the internal opening through the fistula tract and the overlying skin and soft tissue was anesthetized using 0.5% Marcaine with epinephrine.  The skin and soft tissues were incised using electrocautery, ensuring the internal sphincter muscles were not involved.  There were no muscle fibers divided during the fistulotomy procedure.  Once the entirety of the fistula had been unroofed, electrocautery was used to obtain hemostasis along the circumferential tissues.  Further injection of local anesthesia was achieved and the open wound was then dressed with 4 x 4's and mesh panties.  She was then returned to supine positioning, extubated, and transferred to PACU in stable condition with all counts correct per nursing.    Radha Mao MD  General, Robotic, and Endoscopic Surgery  [facility]    [address], [address]  [place] [address]  P: [phone]  F: [phone]

## 2025-04-25 NOTE — ANESTHESIA PROCEDURE NOTES
Airway  Reason: elective    Date/Time: 4/25/2025 12:27 PM  Airway not difficult    General Information and Staff    Patient location during procedure: OR  Anesthesiologist: Magdiel Lozoya MD  CRNA/CAA: Lucia Bryan CRNA    Indications and Patient Condition  Indications for airway management: airway protection    Preoxygenated: yes  MILS maintained throughout    Mask difficulty assessment: 2 - vent by mask + OA or adjuvant +/- NMBA    Final Airway Details    Final airway type: endotracheal airway      Successful airway: ETT  Cuffed: yes   Successful intubation technique: direct laryngoscopy  Adjuncts used in placement: intubating stylet, cricoid pressure and Bougie  Endotracheal tube insertion site: oral  Blade: Rai  Blade size: 3  ETT size (mm): 7.0  Cormack-Lehane Classification: grade IIb - view of arytenoids or posterior of glottis only  Placement verified by: chest auscultation and capnometry   Cuff volume (mL): 8  Measured from: lips  ETT/EBT  to lips (cm): 22  Number of attempts at approach: 2  Assessment: lips, teeth, and gum same as pre-op and atraumatic intubation

## 2025-04-25 NOTE — ANESTHESIA POSTPROCEDURE EVALUATION
Patient: Kadi Gonzalez    Procedure Summary       Date: 04/25/25 Room / Location: [facility] OR [address] [place] [address] MAIN OR    Anesthesia Start: 1215 Anesthesia Stop: 1310    Procedures:       RECTAL EXAM UNDER ANESTHESIA (Rectum)      RECTAL FISTULOTOMY (Rectum) Diagnosis:       Anal fistula      (Anal fistula [K60.30])    Surgeons: Radha Mao MD Provider: Magdiel Lozoya MD    Anesthesia Type: general ASA Status: 3            Anesthesia Type: general    Vitals  Vitals Value Taken Time   /92 04/25/25 13:45   Temp 36.8 °C (98.2 °F) 04/25/25 13:15   Pulse 67 04/25/25 13:53   Resp 15 04/25/25 13:45   SpO2 100 % 04/25/25 13:53   Vitals shown include unfiled device data.        Post Anesthesia Care and Evaluation    Patient location during evaluation: bedside  Patient participation: complete - patient participated  Level of consciousness: awake and alert  Pain management: adequate    Airway patency: patent  Anesthetic complications: No anesthetic complications    Cardiovascular status: acceptable  Respiratory status: acceptable  Hydration status: acceptable    Comments: /72   Pulse 63   Temp 36.8 °C (98.2 °F) (Oral)   Resp 16   LMP 05/04/2021 (Approximate)   SpO2 99%

## 2025-04-25 NOTE — ANESTHESIA PREPROCEDURE EVALUATION
Anesthesia Evaluation     Patient summary reviewed   history of anesthetic complications:  PONV  NPO Solid Status: > 8 hours  NPO Liquid Status: > 2 hours           Airway   Mallampati: I  TM distance: >3 FB  Neck ROM: full  Dental      Comment: Denies any chipped, cracked, or loose teeth     Pulmonary - normal exam   (+) a smoker (1ppd for 30 years) Current, cigarettes, asthma,  Cardiovascular - normal exam  Exercise tolerance: good (4-7 METS)    (-) hypertension, past MI, CAD, dysrhythmias, angina      Neuro/Psych  (+) psychiatric history Anxiety and Depression  (-) seizures, TIA, CVA  GI/Hepatic/Renal/Endo    (+) thyroid problem thyroid nodules  (-) liver disease, no renal disease, diabetes    ROS Comment: Anal fistula    Musculoskeletal     Abdominal    Substance History      OB/GYN          Other                          Anesthesia Plan    ASA 3     general     (Complications of general anesthesia include but not limited to awareness under anesthesia, nausea, vomiting, sore throat, hoarseness, chipped or cracked teeth, MI, CVA, or serious allergic reaction. )  intravenous induction     Anesthetic plan, risks, benefits, and alternatives have been provided, discussed and informed consent has been obtained with: patient.    Plan discussed with CRNA and attending.        CODE STATUS:

## 2025-04-25 NOTE — H&P
General Surgery  History & Physical    CC: Anal fistula    HPI: The patient is a pleasant 49 y.o. year-old lady who presents today for evaluation of a cystic lesion just adjacent to her anus which has been present in a constant duration for several months.  She was in good health and felt no abnormalities of the perianal skin until one day when she noticed some drainage on her underwear.  The drainage has occurred daily since that time with nothing seeming to make it any better or worse.  She denies any significant rectal or anal pain and does not notice any swelling of the perianal skin.  She saw her primary care provider who thought this might represent a pilonidal cyst as it was within the posterior midline of her anal skin, appearing as a small dimple in the skin as most pilonidal cysts would.  There is no evidence for further dimple extension up into the gluteal cleft.  She just had a colonoscopy for screening in December of last year at [facility] by Dr. Izaiah Cevallos, and no abnormalities were found.    Past Medical History:   Anxiety with depression  Asthma  History of gonorrhea  History of HPV  Cervical dysplasia    Past Surgical History:   Bilateral breast augmentation  Cervical biopsy with conization  Lipoma excision  Colonoscopy (December 2024 at [facility]-normal)    Medications:   Albuterol inhaler 2 puffs as needed for wheezing  Adderall 20 mg twice daily  Citalopram 40 mg daily  Flexeril as needed  Lyrica 75 mg daily    Allergies: Penicillin (hives)    Family History: Paternal grandmother with history of DVT and PE, maternal grandfather with history of lymphoma, father with history of transient ischemic attack, no family history of gastrointestinal malignancy    Social History: Single, smokes 1 pack/day cigarettes, no current alcohol use    ROS:   Constitutional: Negative for fevers or chills  HENT: Negative for hearing loss or runny nose  Eyes: Negative for vision changes or scleral  icterus  Respiratory: Negative for cough or shortness of breath  Cardiovascular: Negative for chest pain or heart palpitations  Gastrointestinal: Negative for abdominal distension, nausea, vomiting, constipation, melena, or hematochezia  Genitourinary: Negative for hematuria or dysuria  Musculoskeletal: Negative for joint swelling or gait instability  Neurologic: Negative for tremors or seizures  Psychiatric: Negative for suicidal ideations or agitation  All other systems reviewed and negative    Physical Exam:  Vitals:    04/25/25 1113   BP: 117/74   Pulse: 79   Resp: 16   Temp: 97.8 °F (36.6 °C)   SpO2: 97%   Height: 160 cm  Weight: 66.5 kg  BMI: 25.95  General: No acute distress, well-nourished & well-developed  HEAD: normocephalic, atraumatic  EYES: normal conjunctiva, sclera anicteric  EARS: grossly normal hearing  NECK: supple, no thyromegaly  CARDIOVASCULAR: regular rate and rhythm  RESPIRATORY: clear to auscultation bilaterally  GASTROINTESTINAL: soft, nontender, non-distended  MUSCULOSKELETAL: normal gait and station. No gross extremity abnormalities  PSYCHIATRIC: oriented x3, normal mood and affect  RECTUM: Posterior midline external anal skin shows a small dimple in the skin with a linear subcutaneous palpable tract extending closer to the anus/rectum consistent with a likely posterior midline anal fistula, there is no evidence for pilonidal cyst within the midline gluteal cleft where the skin appears normal    IMAGING:  None    ASSESSMENT & PLAN  Ms. 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  I reviewed her recent colonoscopy report.  I would recommend proceeding with a rectal exam under anesthesia with possible anal fistulotomy versus seton placement today.  I discussed with her the risks of that procedure which include but are not limited to bleeding, slow wound healing, significant postoperative perianal pain as the wound heals, and possible fecal  incontinence if the internal sphincter muscles are involved.  If the sphincter muscles are clearly involved, a fistulotomy will be avoided to minimize risk of incontinence and a seton will be placed with referral to colorectal surgery to discuss LIFT procedure or mucosal advancement flap.  She has already met with Dr. Tamara Ba in case her services are needed post-op.  Despite the risks of surgery, she has consented to proceed.    Radha Mao MD  General, Robotic, and Endoscopic Surgery  [facility]    [address], [address]  [place] [address]  P: [phone]  F: [phone]

## 2025-05-13 ENCOUNTER — OFFICE VISIT (OUTPATIENT)
Dept: SURGERY | Facility: CLINIC | Age: 50
End: 2025-05-13
Payer: MEDICAID

## 2025-05-13 VITALS
WEIGHT: 162 LBS | OXYGEN SATURATION: 99 % | DIASTOLIC BLOOD PRESSURE: 70 MMHG | BODY MASS INDEX: 28.7 KG/M2 | SYSTOLIC BLOOD PRESSURE: 112 MMHG | HEIGHT: 63 IN | HEART RATE: 57 BPM

## 2025-05-13 DIAGNOSIS — Z09 SURGICAL FOLLOWUP: Primary | ICD-10-CM

## 2025-05-13 PROCEDURE — 99024 POSTOP FOLLOW-UP VISIT: CPT | Performed by: SURGERY

## 2025-05-13 NOTE — PROGRESS NOTES
CHIEF COMPLAINT:   Chief Complaint   Patient presents with    Post-op     RECTAL FISTULOTOMY  4/25/25       HISTORY OF PRESENT ILLNESS:  This is a 49 y.o. female who presents for a post-operative visit after undergoing anal fistulotomy on 4/25/2025.  She has had scant drainage from the wound and mild postoperative pain, but overall her recovery has been much easier than she anticipated.  She denies any fecal incontinence.    PHYSICAL EXAM:  Lungs: Clear  Heart: RRR  ABD:  Soft, nontender, nondistended  Ext: no significant edema, calves nontender  All Rectum: Posterior midline fistulotomy wound is healing well with granulation tissue that is shallow and shows no surrounding cellulitis or fluctuance    A/P:  This is a 49 y.o. female patient who is S/P anal fistulotomy on 4/25/2025    She is healing very well.  Her fistulotomy tract will continue to heal by secondary intention over the next couple of weeks.  She can continue sitz bath's or peribottle irrigation of the open wound after every bowel movement to minimize contamination of the wound.  I see nothing at this time that would suggest a postoperative infection.  I would like to see her back in 4 weeks time for recheck of the wound and have cleared her to return to work and all other activities as tolerated whenever she feels ready.    Radha Mao MD  General, Robotic, and Endoscopic Surgery  [facility]    [address], [address]  [place] [address]  P: [phone]  F: [phone]

## (undated) DEVICE — LOU MINOR PROCEDURE: Brand: MEDLINE INDUSTRIES, INC.

## (undated) DEVICE — JELLY,LUBE,STERILE,FLIP TOP,TUBE,4-OZ: Brand: MEDLINE

## (undated) DEVICE — GAUZE,SPONGE,FLUFF,6"X6.75",STRL,10/TRAY: Brand: MEDLINE

## (undated) DEVICE — PATIENT RETURN ELECTRODE, SINGLE-USE, CONTACT QUALITY MONITORING, ADULT, WITH 9FT CORD, FOR PATIENTS WEIGING OVER 33LBS. (15KG): Brand: MEGADYNE

## (undated) DEVICE — GLV SURG BIOGEL LTX PF 6

## (undated) DEVICE — GOWN,SIRUS,NON REINFRCD,LARGE,SET IN SL: Brand: MEDLINE

## (undated) DEVICE — SUT GUT CHRM 3/0 SH 27IN G122H

## (undated) DEVICE — SYR LL TP 10ML STRL

## (undated) DEVICE — SUT SILK 2/0 TIES 18IN A185H

## (undated) DEVICE — NDL HYPO PRECISIONGLIDE REG 25G 1 1/2

## (undated) DEVICE — SKIN PREP TRAY 4 COMPARTM TRAY: Brand: MEDLINE INDUSTRIES, INC.

## (undated) DEVICE — PANTY KNIT MATERN L/XL

## (undated) DEVICE — ANTIBACTERIAL UNDYED BRAIDED (POLYGLACTIN 910), SYNTHETIC ABSORBABLE SUTURE: Brand: COATED VICRYL

## (undated) DEVICE — GLV SURG SENSICARE POLYISPRN W/ALOE PF LF 6.5 GRN STRL

## (undated) DEVICE — INTENDED FOR TISSUE SEPARATION, AND OTHER PROCEDURES THAT REQUIRE A SHARP SURGICAL BLADE TO PUNCTURE OR CUT.: Brand: BARD-PARKER ® CARBON RIB-BACK BLADES